# Patient Record
Sex: FEMALE | NOT HISPANIC OR LATINO | Employment: OTHER | URBAN - METROPOLITAN AREA
[De-identification: names, ages, dates, MRNs, and addresses within clinical notes are randomized per-mention and may not be internally consistent; named-entity substitution may affect disease eponyms.]

---

## 2017-01-04 ENCOUNTER — ROUTINE PRENATAL (OUTPATIENT)
Dept: OBSTETRICS AND GYNECOLOGY | Facility: CLINIC | Age: 32
End: 2017-01-04
Payer: MEDICAID

## 2017-01-04 ENCOUNTER — OFFICE VISIT (OUTPATIENT)
Dept: MATERNAL FETAL MEDICINE | Facility: CLINIC | Age: 32
End: 2017-01-04
Payer: MEDICAID

## 2017-01-04 VITALS — DIASTOLIC BLOOD PRESSURE: 62 MMHG | WEIGHT: 184 LBS | SYSTOLIC BLOOD PRESSURE: 110 MMHG | BODY MASS INDEX: 33.65 KG/M2

## 2017-01-04 DIAGNOSIS — Z34.93 PREGNANCY, OBSTETRICAL CARE, THIRD TRIMESTER: ICD-10-CM

## 2017-01-04 DIAGNOSIS — Z36.89 ENCOUNTER FOR ULTRASOUND TO CHECK FETAL GROWTH: ICD-10-CM

## 2017-01-04 DIAGNOSIS — Z91.89 AT RISK FOR INEFFECTIVE BREASTFEEDING: ICD-10-CM

## 2017-01-04 PROCEDURE — 99211 OFF/OP EST MAY X REQ PHY/QHP: CPT | Mod: PBBFAC | Performed by: ADVANCED PRACTICE MIDWIFE

## 2017-01-04 PROCEDURE — 99212 OFFICE O/P EST SF 10 MIN: CPT | Mod: TH,S$PBB,, | Performed by: ADVANCED PRACTICE MIDWIFE

## 2017-01-04 PROCEDURE — 99499 UNLISTED E&M SERVICE: CPT | Mod: S$PBB,,, | Performed by: OBSTETRICS & GYNECOLOGY

## 2017-01-04 PROCEDURE — 99999 PR PBB SHADOW E&M-EST. PATIENT-LVL I: CPT | Mod: PBBFAC,,, | Performed by: ADVANCED PRACTICE MIDWIFE

## 2017-01-04 PROCEDURE — 76816 OB US FOLLOW-UP PER FETUS: CPT | Mod: 26,S$PBB,, | Performed by: OBSTETRICS & GYNECOLOGY

## 2017-01-04 NOTE — PROGRESS NOTES
31 y.o. female  at 39w3d by LMP c/w 9wk US  Reports + FM, denies VB, LOF or regular CTX  Having irregular cramping  Has kasi Esquivel who will be coming with her to birth  Comes from US with MFM today  Reviewed US: vertex, mvp 7.0cm, efw 4032g at 84% (ac 89%)  -- Previous suspect macrosomia resolved  -- She communicates she is interested in attempting vaginal delivery does not want primary elective c/section  Hx SI and hospitalization in 2016  -- Without HI or SI today  -- Has spoken with Eleanor Madrigal NP, twice  -- Has decided not to start medication at this time but will continue to evaluate need for medication and will consider medication after delivery prn  -- Agrees to call immediately with development of HI/SI  TW lbs   Reviewed GBS neg   Reviewed repeat HIV/RPR neg/neg  Reviewed warning signs, normal FKCs, labor precautions and how/when to call.  RTC x 1 wks, call or present sooner prn.

## 2017-01-07 ENCOUNTER — ANESTHESIA (OUTPATIENT)
Dept: OBSTETRICS AND GYNECOLOGY | Facility: OTHER | Age: 32
End: 2017-01-07
Payer: MEDICAID

## 2017-01-07 ENCOUNTER — ANESTHESIA EVENT (OUTPATIENT)
Dept: OBSTETRICS AND GYNECOLOGY | Facility: OTHER | Age: 32
End: 2017-01-07
Payer: MEDICAID

## 2017-01-07 ENCOUNTER — HOSPITAL ENCOUNTER (INPATIENT)
Facility: OTHER | Age: 32
LOS: 4 days | Discharge: HOME | End: 2017-01-11
Attending: OBSTETRICS & GYNECOLOGY | Admitting: OBSTETRICS & GYNECOLOGY
Payer: MEDICAID

## 2017-01-07 DIAGNOSIS — Z98.891 S/P CESAREAN SECTION: Primary | ICD-10-CM

## 2017-01-07 DIAGNOSIS — Z34.93: ICD-10-CM

## 2017-01-07 DIAGNOSIS — Z37.9 NORMAL LABOR: ICD-10-CM

## 2017-01-07 PROCEDURE — 10907ZC DRAINAGE OF AMNIOTIC FLUID, THERAPEUTIC FROM PRODUCTS OF CONCEPTION, VIA NATURAL OR ARTIFICIAL OPENING: ICD-10-PCS | Performed by: OBSTETRICS & GYNECOLOGY

## 2017-01-07 PROCEDURE — 72100002 HC LABOR CARE, 1ST 8 HOURS

## 2017-01-07 PROCEDURE — 99283 EMERGENCY DEPT VISIT LOW MDM: CPT

## 2017-01-07 PROCEDURE — 11000001 HC ACUTE MED/SURG PRIVATE ROOM

## 2017-01-07 RX ORDER — OXYTOCIN/RINGER'S LACTATE 20/1000 ML
2 PLASTIC BAG, INJECTION (ML) INTRAVENOUS CONTINUOUS
Status: DISCONTINUED | OUTPATIENT
Start: 2017-01-07 | End: 2017-01-11 | Stop reason: HOSPADM

## 2017-01-07 RX ORDER — ONDANSETRON 8 MG/1
8 TABLET, ORALLY DISINTEGRATING ORAL EVERY 8 HOURS PRN
Status: DISCONTINUED | OUTPATIENT
Start: 2017-01-07 | End: 2017-01-08

## 2017-01-07 RX ORDER — BUPIVACAINE HYDROCHLORIDE 2.5 MG/ML
INJECTION, SOLUTION EPIDURAL; INFILTRATION; INTRACAUDAL
Status: DISPENSED
Start: 2017-01-07 | End: 2017-01-08

## 2017-01-07 RX ORDER — FENTANYL CITRATE 50 UG/ML
INJECTION, SOLUTION INTRAMUSCULAR; INTRAVENOUS
Status: DISPENSED
Start: 2017-01-07 | End: 2017-01-08

## 2017-01-07 RX ORDER — SODIUM CHLORIDE, SODIUM LACTATE, POTASSIUM CHLORIDE, CALCIUM CHLORIDE 600; 310; 30; 20 MG/100ML; MG/100ML; MG/100ML; MG/100ML
INJECTION, SOLUTION INTRAVENOUS CONTINUOUS
Status: DISCONTINUED | OUTPATIENT
Start: 2017-01-07 | End: 2017-01-08

## 2017-01-07 RX ORDER — FENTANYL/BUPIVACAINE/NS/PF 2MCG/ML-.1
PLASTIC BAG, INJECTION (ML) INJECTION
Status: DISPENSED
Start: 2017-01-07 | End: 2017-01-08

## 2017-01-07 RX ORDER — MISOPROSTOL 200 UG/1
600 TABLET ORAL
Status: DISCONTINUED | OUTPATIENT
Start: 2017-01-07 | End: 2017-01-08
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
## 2017-01-07 NOTE — PROGRESS NOTES
Doing well  Ambulating  VSS  's per doppler  CTX 4-6/50-60  VE 6/100/-2/bulging bag  Continue expectant management

## 2017-01-07 NOTE — Clinical Note
I certify that Inpatient services for greater than or equal to 2 midnights are medically necessary:: Yes   Transfer To (Destination): [facility], [place] LABOR AND DELIVERY [[id number]]   Diagnosis: Normal labor [515652]   Admitting Provider:: ALVARO CURRAN [[id number]]   Future Attending Provider: YRIS SALAS [[id number]]   Bed Type Preference: Standard [6]   Reason for IP Medical Treatment  (Clinical interventions that can only be accomplished in the IP setting? ) :: labor   Estimated Length of Stay:: 2 midnights   Plans for Post-Acute care--if anticipated (pick the single best option):: A. No post acute care anticipated at this time

## 2017-01-07 NOTE — PROVIDER PROGRESS NOTES - EMERGENCY DEPT.
Encounter Date: 1/7/2017    ED Physician Progress Notes        Physician Note:   Pt with PROM since 9:30 yesterday am with cont leakage throughout the day. Ctx began at 8 pm last night and have gotten stronger.   cx 3/90/-2 with palp buldging forbag   Will admit to ABC for delivery.

## 2017-01-07 NOTE — PROGRESS NOTES
In bed with partner and  at her side  C/O increased intensity with ctx.  VSS  's  CTX Q 3-5/50/mod  VE 4/90/-3 bulging bag  Continue expectant management  Reassess prn

## 2017-01-07 NOTE — PROGRESS NOTES
Ambulating in room    Breathing through ctx with   VSS  's  Ctx. Q 3-5/50-60  Will continue expectant management   Reassess prn

## 2017-01-07 NOTE — PROGRESS NOTES
Working with kasi  Asked to be checked  VSS  's  Ctx Q 4-5/ctx Q 50-60/mild to palp  VE 5.90/-3/cl fll  Continue expectant management  Reassess prn

## 2017-01-07 NOTE — IP AVS SNAPSHOT
[facility] Location ([place]) [address] [address]  Phone: [phone]           I have received a copy of my After Visit Summary and discharge instructions from [facility].    INSTRUCTIONS RECEIVED AND UNDERSTOOD BY:                     Patient/Patient Representative: ________________________________________________________________     Date/Time: ________________________________________________________________                     Instructions Given By: ________________________________________________________________     Date/Time: ________________________________________________________________

## 2017-01-08 ENCOUNTER — SURGERY (OUTPATIENT)
Age: 32
End: 2017-01-08

## 2017-01-08 LAB
ABO + RH BLD: NORMAL
ALBUMIN SERPL BCP-MCNC: 3 G/DL
ALLENS TEST: ABNORMAL
ALP SERPL-CCNC: 317 U/L
ALT SERPL W/O P-5'-P-CCNC: 13 U/L
ANION GAP SERPL CALC-SCNC: 20 MMOL/L
AST SERPL-CCNC: 40 U/L
BASOPHILS NFR BLD: 0 %
BILIRUB SERPL-MCNC: 0.6 MG/DL
BLD GP AB SCN CELLS X3 SERPL QL: NORMAL
BUN SERPL-MCNC: 11 MG/DL
CALCIUM SERPL-MCNC: 10.1 MG/DL
CHLORIDE SERPL-SCNC: 100 MMOL/L
CO2 SERPL-SCNC: 10 MMOL/L
CREAT SERPL-MCNC: 0.9 MG/DL
DIFFERENTIAL METHOD: ABNORMAL
EOSINOPHIL NFR BLD: 0 %
ERYTHROCYTE [DISTWIDTH] IN BLOOD BY AUTOMATED COUNT: 13.8 %
EST. GFR  (AFRICAN AMERICAN): >60 ML/MIN/1.73 M^2
EST. GFR  (NON AFRICAN AMERICAN): >60 ML/MIN/1.73 M^2
GLUCOSE SERPL-MCNC: 95 MG/DL
HCO3 UR-SCNC: 15.8 MMOL/L (ref 24–28)
HCT VFR BLD AUTO: 36.7 %
HGB BLD-MCNC: 12.8 G/DL
LDH SERPL L TO P-CCNC: 291 U/L
LYMPHOCYTES NFR BLD: 15 %
MCH RBC QN AUTO: 31.2 PG
MCHC RBC AUTO-ENTMCNC: 34.9 %
MCV RBC AUTO: 90 FL
MONOCYTES NFR BLD: 8 %
NEUTROPHILS NFR BLD: 74 %
NEUTS BAND NFR BLD MANUAL: 3 %
PCO2 BLDA: 53.8 MMHG (ref 35–45)
PH SMN: 7.08 [PH] (ref 7.35–7.45)
PLATELET # BLD AUTO: 337 K/UL
PLATELET BLD QL SMEAR: ABNORMAL
PMV BLD AUTO: 9.8 FL
PO2 BLDA: 13 MMHG (ref 80–100)
POC BE: -14 MMOL/L
POC SATURATED O2: 9 % (ref 95–100)
POTASSIUM SERPL-SCNC: 3.8 MMOL/L
PROT SERPL-MCNC: 7 G/DL
RBC # BLD AUTO: 4.1 M/UL
SAMPLE: ABNORMAL
SITE: ABNORMAL
SODIUM SERPL-SCNC: 130 MMOL/L
WBC # BLD AUTO: 27.13 K/UL

## 2017-01-08 PROCEDURE — 63600175 PHARM REV CODE 636 W HCPCS: Performed by: OBSTETRICS & GYNECOLOGY

## 2017-01-08 PROCEDURE — 25000003 PHARM REV CODE 250: Performed by: ANESTHESIOLOGY

## 2017-01-08 PROCEDURE — 59514 CESAREAN DELIVERY ONLY: CPT | Mod: GB,,, | Performed by: OBSTETRICS & GYNECOLOGY

## 2017-01-08 PROCEDURE — 76815 OB US LIMITED FETUS(S): CPT

## 2017-01-08 PROCEDURE — 11000001 HC ACUTE MED/SURG PRIVATE ROOM

## 2017-01-08 PROCEDURE — 36000685 HC CESAREAN SECTION LEVEL I

## 2017-01-08 PROCEDURE — 25000003 PHARM REV CODE 250: Performed by: STUDENT IN AN ORGANIZED HEALTH CARE EDUCATION/TRAINING PROGRAM

## 2017-01-08 PROCEDURE — 72100003 HC LABOR CARE, EA. ADDL. 8 HRS

## 2017-01-08 PROCEDURE — 99900035 HC TECH TIME PER 15 MIN (STAT)

## 2017-01-08 PROCEDURE — 83615 LACTATE (LD) (LDH) ENZYME: CPT

## 2017-01-08 PROCEDURE — 85027 COMPLETE CBC AUTOMATED: CPT

## 2017-01-08 PROCEDURE — 80053 COMPREHEN METABOLIC PANEL: CPT

## 2017-01-08 PROCEDURE — 63600175 PHARM REV CODE 636 W HCPCS: Performed by: ANESTHESIOLOGY

## 2017-01-08 PROCEDURE — 86900 BLOOD TYPING SEROLOGIC ABO: CPT

## 2017-01-08 PROCEDURE — 72100002 HC LABOR CARE, 1ST 8 HOURS

## 2017-01-08 PROCEDURE — 82803 BLOOD GASES ANY COMBINATION: CPT

## 2017-01-08 PROCEDURE — 86901 BLOOD TYPING SEROLOGIC RH(D): CPT

## 2017-01-08 PROCEDURE — 37000009 HC ANESTHESIA EA ADD 15 MINS: Performed by: OBSTETRICS & GYNECOLOGY

## 2017-01-08 PROCEDURE — 36415 COLL VENOUS BLD VENIPUNCTURE: CPT

## 2017-01-08 PROCEDURE — 27800517 HC TRAY,EPIDURAL-CONTINUOUS: Performed by: ANESTHESIOLOGY

## 2017-01-08 PROCEDURE — 27200710 HC EPIDURAL INFUSION PUMP SET: Performed by: ANESTHESIOLOGY

## 2017-01-08 PROCEDURE — 25000003 PHARM REV CODE 250: Performed by: ADVANCED PRACTICE MIDWIFE

## 2017-01-08 PROCEDURE — 37000008 HC ANESTHESIA 1ST 15 MINUTES: Performed by: OBSTETRICS & GYNECOLOGY

## 2017-01-08 PROCEDURE — 85007 BL SMEAR W/DIFF WBC COUNT: CPT

## 2017-01-08 PROCEDURE — 51702 INSERT TEMP BLADDER CATH: CPT

## 2017-01-08 PROCEDURE — 59514 CESAREAN DELIVERY ONLY: CPT | Mod: ,,, | Performed by: ANESTHESIOLOGY

## 2017-01-08 PROCEDURE — 62326 NJX INTERLAMINAR LMBR/SAC: CPT | Performed by: ANESTHESIOLOGY

## 2017-01-08 RX ORDER — SODIUM CHLORIDE, SODIUM LACTATE, POTASSIUM CHLORIDE, CALCIUM CHLORIDE 600; 310; 30; 20 MG/100ML; MG/100ML; MG/100ML; MG/100ML
INJECTION, SOLUTION INTRAVENOUS CONTINUOUS
Status: ACTIVE | OUTPATIENT
Start: 2017-01-08 | End: 2017-01-08

## 2017-01-08 RX ORDER — FAMOTIDINE 10 MG/ML
20 INJECTION INTRAVENOUS ONCE
Status: COMPLETED | OUTPATIENT
Start: 2017-01-08 | End: 2017-01-08

## 2017-01-08 RX ORDER — KETOROLAC TROMETHAMINE 30 MG/ML
30 INJECTION, SOLUTION INTRAMUSCULAR; INTRAVENOUS EVERY 6 HOURS
Status: COMPLETED | OUTPATIENT
Start: 2017-01-08 | End: 2017-01-08

## 2017-01-08 RX ORDER — MORPHINE SULFATE 0.5 MG/ML
INJECTION, SOLUTION EPIDURAL; INTRATHECAL; INTRAVENOUS
Status: DISCONTINUED | OUTPATIENT
Start: 2017-01-08 | End: 2017-01-08

## 2017-01-08 RX ORDER — HYDROCORTISONE 25 MG/G
CREAM TOPICAL 3 TIMES DAILY PRN
Status: DISCONTINUED | OUTPATIENT
Start: 2017-01-08 | End: 2017-01-11 | Stop reason: HOSPADM

## 2017-01-08 RX ORDER — DIPHENHYDRAMINE HCL 25 MG
25 CAPSULE ORAL EVERY 4 HOURS PRN
Status: DISCONTINUED | OUTPATIENT
Start: 2017-01-08 | End: 2017-01-11 | Stop reason: HOSPADM

## 2017-01-08 RX ORDER — ONDANSETRON 4 MG/1
8 TABLET, ORALLY DISINTEGRATING ORAL EVERY 8 HOURS PRN
Status: DISCONTINUED | OUTPATIENT
Start: 2017-01-08 | End: 2017-01-11 | Stop reason: HOSPADM

## 2017-01-08 RX ORDER — CHLOROPROCAINE HYDROCHLORIDE 20 MG/ML
INJECTION, SOLUTION EPIDURAL; INFILTRATION; INTRACAUDAL; PERINEURAL
Status: DISCONTINUED | OUTPATIENT
Start: 2017-01-08 | End: 2017-01-08

## 2017-01-08 RX ORDER — ADHESIVE BANDAGE
30 BANDAGE TOPICAL 2 TIMES DAILY PRN
Status: DISCONTINUED | OUTPATIENT
Start: 2017-01-09 | End: 2017-01-11 | Stop reason: HOSPADM

## 2017-01-08 RX ORDER — OXYCODONE AND ACETAMINOPHEN 10; 325 MG/1; MG/1
1 TABLET ORAL EVERY 4 HOURS PRN
Status: DISCONTINUED | OUTPATIENT
Start: 2017-01-09 | End: 2017-01-11 | Stop reason: HOSPADM

## 2017-01-08 RX ORDER — IBUPROFEN 600 MG/1
600 TABLET ORAL EVERY 6 HOURS
Status: DISCONTINUED | OUTPATIENT
Start: 2017-01-09 | End: 2017-01-11 | Stop reason: HOSPADM

## 2017-01-08 RX ORDER — OXYTOCIN/RINGER'S LACTATE 20/1000 ML
41.65 PLASTIC BAG, INJECTION (ML) INTRAVENOUS CONTINUOUS
Status: ACTIVE | OUTPATIENT
Start: 2017-01-08 | End: 2017-01-08

## 2017-01-08 RX ORDER — ACETAMINOPHEN 325 MG/1
650 TABLET ORAL EVERY 6 HOURS
Status: COMPLETED | OUTPATIENT
Start: 2017-01-08 | End: 2017-01-09

## 2017-01-08 RX ORDER — ONDANSETRON 2 MG/ML
4 INJECTION INTRAMUSCULAR; INTRAVENOUS ONCE
Status: DISCONTINUED | OUTPATIENT
Start: 2017-01-08 | End: 2017-01-08

## 2017-01-08 RX ORDER — SODIUM CITRATE AND CITRIC ACID MONOHYDRATE 334; 500 MG/5ML; MG/5ML
30 SOLUTION ORAL ONCE
Status: COMPLETED | OUTPATIENT
Start: 2017-01-08 | End: 2017-01-08

## 2017-01-08 RX ORDER — OXYCODONE HYDROCHLORIDE 5 MG/1
5 TABLET ORAL EVERY 4 HOURS PRN
Status: DISPENSED | OUTPATIENT
Start: 2017-01-08 | End: 2017-01-09

## 2017-01-08 RX ORDER — FENTANYL/BUPIVACAINE/NS/PF 2MCG/ML-.1
PLASTIC BAG, INJECTION (ML) INJECTION CONTINUOUS PRN
Status: DISCONTINUED | OUTPATIENT
Start: 2017-01-08 | End: 2017-01-08

## 2017-01-08 RX ORDER — FENTANYL CITRATE 50 UG/ML
INJECTION, SOLUTION INTRAMUSCULAR; INTRAVENOUS
Status: DISCONTINUED | OUTPATIENT
Start: 2017-01-08 | End: 2017-01-08

## 2017-01-08 RX ORDER — OXYCODONE AND ACETAMINOPHEN 5; 325 MG/1; MG/1
1 TABLET ORAL EVERY 4 HOURS PRN
Status: DISCONTINUED | OUTPATIENT
Start: 2017-01-09 | End: 2017-01-11 | Stop reason: HOSPADM

## 2017-01-08 RX ORDER — ONDANSETRON 2 MG/ML
4 INJECTION INTRAMUSCULAR; INTRAVENOUS EVERY 12 HOURS PRN
Status: DISCONTINUED | OUTPATIENT
Start: 2017-01-08 | End: 2017-01-11 | Stop reason: HOSPADM

## 2017-01-08 RX ORDER — DIPHENHYDRAMINE HYDROCHLORIDE 50 MG/ML
12.5 INJECTION INTRAMUSCULAR; INTRAVENOUS EVERY 4 HOURS PRN
Status: DISCONTINUED | OUTPATIENT
Start: 2017-01-08 | End: 2017-01-08

## 2017-01-08 RX ORDER — FENTANYL/BUPIVACAINE/NS/PF 2MCG/ML-.1
PLASTIC BAG, INJECTION (ML) INJECTION CONTINUOUS
Status: DISCONTINUED | OUTPATIENT
Start: 2017-01-08 | End: 2017-01-08

## 2017-01-08 RX ORDER — DIPHENHYDRAMINE HYDROCHLORIDE 50 MG/ML
25 INJECTION INTRAMUSCULAR; INTRAVENOUS EVERY 4 HOURS PRN
Status: DISCONTINUED | OUTPATIENT
Start: 2017-01-08 | End: 2017-01-11 | Stop reason: HOSPADM

## 2017-01-08 RX ORDER — FAMOTIDINE 10 MG/ML
20 INJECTION INTRAVENOUS ONCE
Status: DISCONTINUED | OUTPATIENT
Start: 2017-01-08 | End: 2017-01-08

## 2017-01-08 RX ORDER — CEFAZOLIN SODIUM 2 G/50ML
2 SOLUTION INTRAVENOUS ONCE
Status: COMPLETED | OUTPATIENT
Start: 2017-01-08 | End: 2017-01-08

## 2017-01-08 RX ORDER — LIDOCAINE HYDROCHLORIDE AND EPINEPHRINE 15; 5 MG/ML; UG/ML
INJECTION, SOLUTION EPIDURAL
Status: DISCONTINUED | OUTPATIENT
Start: 2017-01-08 | End: 2017-01-08

## 2017-01-08 RX ORDER — AMOXICILLIN 250 MG
1 CAPSULE ORAL NIGHTLY PRN
Status: DISCONTINUED | OUTPATIENT
Start: 2017-01-08 | End: 2017-01-11 | Stop reason: HOSPADM

## 2017-01-08 RX ORDER — METOCLOPRAMIDE HYDROCHLORIDE 5 MG/ML
10 INJECTION INTRAMUSCULAR; INTRAVENOUS ONCE
Status: COMPLETED | OUTPATIENT
Start: 2017-01-08 | End: 2017-01-08

## 2017-01-08 RX ORDER — DOCUSATE SODIUM 100 MG/1
200 CAPSULE, LIQUID FILLED ORAL 2 TIMES DAILY
Status: DISCONTINUED | OUTPATIENT
Start: 2017-01-08 | End: 2017-01-11 | Stop reason: HOSPADM

## 2017-01-08 RX ORDER — OXYCODONE HYDROCHLORIDE 5 MG/1
10 TABLET ORAL EVERY 4 HOURS PRN
Status: DISPENSED | OUTPATIENT
Start: 2017-01-08 | End: 2017-01-09

## 2017-01-08 RX ORDER — SODIUM CITRATE AND CITRIC ACID MONOHYDRATE 334; 500 MG/5ML; MG/5ML
30 SOLUTION ORAL ONCE
Status: DISCONTINUED | OUTPATIENT
Start: 2017-01-08 | End: 2017-01-08

## 2017-01-08 RX ORDER — PHENYLEPHRINE HYDROCHLORIDE 10 MG/ML
INJECTION INTRAVENOUS
Status: DISCONTINUED | OUTPATIENT
Start: 2017-01-08 | End: 2017-01-08

## 2017-01-08 RX ORDER — OXYTOCIN 10 [USP'U]/ML
INJECTION, SOLUTION INTRAMUSCULAR; INTRAVENOUS
Status: DISCONTINUED | OUTPATIENT
Start: 2017-01-08 | End: 2017-01-08

## 2017-01-08 RX ORDER — METOCLOPRAMIDE HYDROCHLORIDE 5 MG/ML
10 INJECTION INTRAMUSCULAR; INTRAVENOUS ONCE
Status: DISCONTINUED | OUTPATIENT
Start: 2017-01-08 | End: 2017-01-08

## 2017-01-08 RX ORDER — BISACODYL 10 MG
10 SUPPOSITORY, RECTAL RECTAL ONCE AS NEEDED
Status: ACTIVE | OUTPATIENT
Start: 2017-01-08 | End: 2017-01-08

## 2017-01-08 RX ORDER — SIMETHICONE 80 MG
1 TABLET,CHEWABLE ORAL EVERY 6 HOURS PRN
Status: DISCONTINUED | OUTPATIENT
Start: 2017-01-08 | End: 2017-01-11 | Stop reason: HOSPADM

## 2017-01-08 RX ADMIN — PHENYLEPHRINE HYDROCHLORIDE 200 MCG: 10 INJECTION INTRAVENOUS at 03:01

## 2017-01-08 RX ADMIN — DOCUSATE SODIUM 200 MG: 100 CAPSULE, LIQUID FILLED ORAL at 09:01

## 2017-01-08 RX ADMIN — Medication 10 ML: at 01:01

## 2017-01-08 RX ADMIN — SODIUM CHLORIDE, SODIUM LACTATE, POTASSIUM CHLORIDE, AND CALCIUM CHLORIDE 1000 ML: .6; .31; .03; .02 INJECTION, SOLUTION INTRAVENOUS at 01:01

## 2017-01-08 RX ADMIN — PHENYLEPHRINE HYDROCHLORIDE 100 MCG: 10 INJECTION INTRAVENOUS at 03:01

## 2017-01-08 RX ADMIN — SODIUM CHLORIDE, SODIUM LACTATE, POTASSIUM CHLORIDE, AND CALCIUM CHLORIDE: 600; 310; 30; 20 INJECTION, SOLUTION INTRAVENOUS at 03:01

## 2017-01-08 RX ADMIN — KETOROLAC TROMETHAMINE 30 MG: 30 INJECTION, SOLUTION INTRAMUSCULAR at 03:01

## 2017-01-08 RX ADMIN — FENTANYL CITRATE 100 MCG: 50 INJECTION, SOLUTION INTRAMUSCULAR; INTRAVENOUS at 01:01

## 2017-01-08 RX ADMIN — ACETAMINOPHEN 650 MG: 325 TABLET ORAL at 03:01

## 2017-01-08 RX ADMIN — ACETAMINOPHEN 650 MG: 325 TABLET ORAL at 08:01

## 2017-01-08 RX ADMIN — OXYTOCIN 3 UNITS: 10 INJECTION, SOLUTION INTRAMUSCULAR; INTRAVENOUS at 03:01

## 2017-01-08 RX ADMIN — OXYCODONE HYDROCHLORIDE 5 MG: 5 TABLET ORAL at 01:01

## 2017-01-08 RX ADMIN — KETOROLAC TROMETHAMINE 30 MG: 30 INJECTION, SOLUTION INTRAMUSCULAR at 09:01

## 2017-01-08 RX ADMIN — OXYCODONE HYDROCHLORIDE 10 MG: 5 TABLET ORAL at 04:01

## 2017-01-08 RX ADMIN — FENTANYL CITRATE 10 MCG: 50 INJECTION, SOLUTION INTRAMUSCULAR; INTRAVENOUS at 02:01

## 2017-01-08 RX ADMIN — METOCLOPRAMIDE 10 MG: 5 INJECTION, SOLUTION INTRAMUSCULAR; INTRAVENOUS at 01:01

## 2017-01-08 RX ADMIN — LIDOCAINE HYDROCHLORIDE AND EPINEPHRINE 3 ML: 15; 5 INJECTION, SOLUTION EPIDURAL at 01:01

## 2017-01-08 RX ADMIN — CHLOROPROCAINE HYDROCHLORIDE 5 ML: 20 INJECTION, SOLUTION EPIDURAL; INFILTRATION; INTRACAUDAL; PERINEURAL at 03:01

## 2017-01-08 RX ADMIN — OXYCODONE HYDROCHLORIDE 10 MG: 5 TABLET ORAL at 09:01

## 2017-01-08 RX ADMIN — FAMOTIDINE 20 MG: 10 INJECTION, SOLUTION INTRAVENOUS at 01:01

## 2017-01-08 RX ADMIN — CHLOROPROCAINE HYDROCHLORIDE 10 ML: 20 INJECTION, SOLUTION EPIDURAL; INFILTRATION; INTRACAUDAL; PERINEURAL at 02:01

## 2017-01-08 RX ADMIN — OXYCODONE HYDROCHLORIDE 10 MG: 5 TABLET ORAL at 07:01

## 2017-01-08 RX ADMIN — Medication 10 ML/HR: at 01:01

## 2017-01-08 RX ADMIN — CEFAZOLIN SODIUM 2 G: 2 SOLUTION INTRAVENOUS at 02:01

## 2017-01-08 RX ADMIN — ACETAMINOPHEN 650 MG: 325 TABLET ORAL at 09:01

## 2017-01-08 RX ADMIN — SODIUM CITRATE AND CITRIC ACID MONOHYDRATE 30 ML: 500; 334 SOLUTION ORAL at 01:01

## 2017-01-08 RX ADMIN — DOCUSATE SODIUM 200 MG: 100 CAPSULE, LIQUID FILLED ORAL at 08:01

## 2017-01-08 RX ADMIN — CHLOROPROCAINE HYDROCHLORIDE 5 ML: 20 INJECTION, SOLUTION EPIDURAL; INFILTRATION; INTRACAUDAL; PERINEURAL at 02:01

## 2017-01-08 RX ADMIN — SODIUM CHLORIDE, SODIUM LACTATE, POTASSIUM CHLORIDE, AND CALCIUM CHLORIDE: .6; .31; .03; .02 INJECTION, SOLUTION INTRAVENOUS at 01:01

## 2017-01-08 RX ADMIN — Medication 0.15 MG: at 02:01

## 2017-01-08 NOTE — ANESTHESIA PROCEDURE NOTES
Epidural    Patient location during procedure: OB   Reason for block: primary anesthetic   Diagnosis: IUP   Start time: 1/8/2017 1:06 AM  Timeout: 1/8/2017 1:05 AM  End time: 1/8/2017 1:15 AM  Surgery related to: Vaginal Delivery  Staffing  Anesthesiologist: EMMANUEL KRISHNAN  Resident/CRNA: LANI METZ  Performed by: resident/CRNA   Preanesthetic Checklist  Completed: patient identified, site marked, surgical consent, pre-op evaluation, timeout performed, IV checked, risks and benefits discussed, monitors and equipment checked, anesthesia consent given, hand hygiene performed and patient being monitored  Preparation  Patient position: sitting  Prep: ChloraPrep  Patient monitoring: Pulse Ox  Epidural  Skin Anesthetic: lidocaine 1%  Skin Wheal: 3 mL  Administration type: continuous  Approach: midline  Interspace: L3-4  Injection technique: XOCHILT saline  Needle and Epidural Catheter  Needle type: Tuohy   Needle gauge: 17  Needle length: 3.5 inches  Needle insertion depth: 5 cm  Catheter type: springwound  Catheter size: 19 G  Catheter at skin depth: 9 cm  Test dose: 3 mL of lidocaine 1.5% with Epi 1-to-200,000  Additional Documentation: incremental injection, negative aspiration for heme and CSF, no paresthesia on injection, no signs/symptoms of IV or SA injection, no significant pain on injection and no significant complaints from patient  Needle localization: anatomical landmarks  Medications:  Bolus administered: 10 mL of 0.125% bupivacaine  Opioid administered: 100 mcg of   fentanyl  Volume per aspiration: 5 mL  Time between aspirations: 5 minutes  Assessment   Dermatomal levels determined by ice  Ease of block: easy  Patient's tolerance of the procedure: comfortable throughout block and no complaints

## 2017-01-08 NOTE — PROGRESS NOTES
CNM Social Note  Very chatty  Doing well this am  States pain is well controlled with po rx  Baby in transferring from NICU to mother's room this pm

## 2017-01-08 NOTE — PROGRESS NOTES
In bed laboring with bar  C/O increased intensity with ctx.  Occas urge to push  Asked to be checked.   's  CTX Q 3-4/50-60  VE no change   AROM Cl Fl  Will continue expectant management   If no change in 2 hours will start pitocin per protocol

## 2017-01-08 NOTE — ANESTHESIA PROCEDURE NOTES
CSE    Patient location during procedure: OR  Start time: 1/8/2017 2:48 AM  Timeout: 1/8/2017 2:47 AM  End time: 1/8/2017 2:54 AM  Staffing  Anesthesiologist: EMMANUEL KRISHNAN  Resident/CRNA: LANI METZ  Performed by: anesthesiologist   Preanesthetic Checklist  Completed: patient identified, surgical consent, pre-op evaluation, timeout performed, IV checked, risks and benefits discussed and monitors and equipment checked  CSE  Patient position: sitting  Prep: ChloraPrep  Patient monitoring: heart rate, continuous pulse ox and frequent blood pressure checks  Approach: midline  Spinal Needle  Needle type: Shai   Needle gauge: 25 G  Needle length: 5 in  Epidural Needle  Injection technique: XOCHILT saline  Needle type: Tuohy   Needle gauge: 17 G  Needle length: 3.5 in  Needle insertion depth: 7 cm  Location: L3-4  Needle localization: anatomical landmarks  Catheter  Catheter type: DEMANDIT  Catheter size: 19 G  Catheter at skin depth: 11 cm  Test dose: lidocaine 1.5% with Epi 1-to-200,000  Additional Documentation: negative aspiration for CSF, negative aspiration for heme and negative test dose  Assessment  Sensory level: T4   Dermatomal levels determined by pinch or prick  Medications:  Bolus administered: 20 mL of 3.0 chloroprocaine  Intrathecal Medications:  administered: primary anesthetic mcg of  fentanyl and morphine (fentanyl 10 mcg, duramorph 150 mcg)  Additional Notes  Epidural test dosed, then dosed with 5+5+5+5 mL chloroprocaine with NaHCO3.

## 2017-01-08 NOTE — TRANSFER OF CARE
"Anesthesia Transfer of Care Note    Patient: Haylee Narvaez    Procedure(s) Performed: Procedure(s) (LRB):  DELIVERY- SECTION (N/A)    Patient location: PACU    Anesthesia Type: CSE    Transport from OR: Transported from OR on room air with adequate spontaneous ventilation    Post pain: adequate analgesia    Post assessment: no apparent anesthetic complications    Post vital signs: stable    Level of consciousness: awake, alert and oriented    Nausea/Vomiting: no nausea/vomiting    Complications: none          Last vitals:   Visit Vitals    BP (!) 161/72    Pulse (!) 121    Temp 36.4 °C (97.6 °F) (Temporal)    Resp (!) 30    Ht 5' 2" (1.575 m)    Wt 83.5 kg (184 lb)    LMP 2016    SpO2 99%    Breastfeeding No    BMI 33.65 kg/m2     "

## 2017-01-08 NOTE — PROGRESS NOTES
Very uncomfortble with ctx. Howling and very verbal  Says she has urge to push with each.  B/P167/92  's  CTX. Q 3-4   VE 5/ edematous cervix  Discussed possible C/S with Dr. Moore for failure to progress  Pt agrees with plan  Also agrees to epidural now.

## 2017-01-08 NOTE — ANESTHESIA PREPROCEDURE EVALUATION
2017  Haylee Narvaez is a 31 y.o., female  admitted for PROM. Pt has PMH asthma.    OB History    Para Term  AB SAB TAB Ectopic Multiple Living   2    1  1         # Outcome Date GA Lbr Kennedy/2nd Weight Sex Delivery Anes PTL Lv   2 Current            1 TAB 05 8w0d             Birth Comments: D&C            [facility] Anesthesia Evaluation    I have reviewed the Patient Summary Reports.    I have reviewed the Nursing Notes.   I have reviewed the Medications.     Review of Systems  Anesthesia Hx:  No problems with previous Anesthesia  History of prior surgery of interest to airway management or planning: Denies Family Hx of Anesthesia complications.   Denies Personal Hx of Anesthesia complications.   Hematology/Oncology:     Oncology Normal    -- Denies Anemia:   EENT/Dental:EENT/Dental Normal   Cardiovascular:  Cardiovascular Normal     Pulmonary:   Asthma    Hepatic/GI:  Hepatic/GI Normal    Neurological:  Neurology Normal    Endocrine:  Endocrine Normal    Psych:   depression          Physical Exam  General:  Well nourished    Airway/Jaw/Neck:  Airway Findings: Mouth Opening: Normal General Airway Assessment: Adult  Mallampati: II  Improves to II with phonation.  TM Distance: Normal, at least 6 cm         Dental:  Dental Findings: In tact   Chest/Lungs:  Chest/Lungs Clear    Heart/Vascular:  Heart Findings: Normal       Mental Status:  Mental Status Findings:  Cooperative, Alert and Oriented         Anesthesia Plan  Type of Anesthesia, risks & benefits discussed:  Anesthesia Type:  CSE, epidural, general, spinal  Patient's Preference:   Intra-op Monitoring Plan:   Intra-op Monitoring Plan Comments:   Post Op Pain Control Plan:   Post Op Pain Control Plan Comments:   Induction:   IV  Beta Blocker:         Informed Consent: Patient understands risks and agrees with Anesthesia plan.   Questions answered. Anesthesia consent signed with patient.  ASA Score: 2     Day of Surgery Review of History & Physical:            Ready For Surgery From Anesthesia Perspective.

## 2017-01-08 NOTE — PROGRESS NOTES
Mother encouraged to provide breastmilk for her NICU baby. Benefits of breastmilk discussed with mother. Mother declines pumping at this time. Mother encouraged to inform nurse if she changes her mind.

## 2017-01-09 PROCEDURE — 11000001 HC ACUTE MED/SURG PRIVATE ROOM

## 2017-01-09 PROCEDURE — 25000003 PHARM REV CODE 250: Performed by: STUDENT IN AN ORGANIZED HEALTH CARE EDUCATION/TRAINING PROGRAM

## 2017-01-09 PROCEDURE — 25000003 PHARM REV CODE 250: Performed by: ANESTHESIOLOGY

## 2017-01-09 RX ORDER — OXYCODONE HYDROCHLORIDE 5 MG/1
10 TABLET ORAL ONCE
Status: DISCONTINUED | OUTPATIENT
Start: 2017-01-09 | End: 2017-01-09

## 2017-01-09 RX ORDER — OXYCODONE HYDROCHLORIDE 5 MG/1
10 TABLET ORAL ONCE
Status: COMPLETED | OUTPATIENT
Start: 2017-01-09 | End: 2017-01-09

## 2017-01-09 RX ADMIN — IBUPROFEN 600 MG: 600 TABLET, FILM COATED ORAL at 01:01

## 2017-01-09 RX ADMIN — DOCUSATE SODIUM 200 MG: 100 CAPSULE, LIQUID FILLED ORAL at 09:01

## 2017-01-09 RX ADMIN — SIMETHICONE CHEW TAB 80 MG 80 MG: 80 TABLET ORAL at 01:01

## 2017-01-09 RX ADMIN — OXYCODONE HYDROCHLORIDE AND ACETAMINOPHEN 1 TABLET: 10; 325 TABLET ORAL at 04:01

## 2017-01-09 RX ADMIN — SIMETHICONE CHEW TAB 80 MG 80 MG: 80 TABLET ORAL at 07:01

## 2017-01-09 RX ADMIN — OXYCODONE HYDROCHLORIDE AND ACETAMINOPHEN 1 TABLET: 10; 325 TABLET ORAL at 09:01

## 2017-01-09 RX ADMIN — OXYCODONE HYDROCHLORIDE 10 MG: 5 TABLET ORAL at 12:01

## 2017-01-09 RX ADMIN — ACETAMINOPHEN 650 MG: 325 TABLET ORAL at 04:01

## 2017-01-09 RX ADMIN — IBUPROFEN 600 MG: 600 TABLET, FILM COATED ORAL at 08:01

## 2017-01-09 RX ADMIN — IBUPROFEN 600 MG: 600 TABLET, FILM COATED ORAL at 07:01

## 2017-01-09 RX ADMIN — OXYCODONE HYDROCHLORIDE 10 MG: 5 TABLET ORAL at 09:01

## 2017-01-09 RX ADMIN — OXYCODONE HYDROCHLORIDE AND ACETAMINOPHEN 1 TABLET: 10; 325 TABLET ORAL at 01:01

## 2017-01-09 RX ADMIN — OXYCODONE HYDROCHLORIDE AND ACETAMINOPHEN 1 TABLET: 10; 325 TABLET ORAL at 05:01

## 2017-01-09 RX ADMIN — DOCUSATE SODIUM 200 MG: 100 CAPSULE, LIQUID FILLED ORAL at 08:01

## 2017-01-09 NOTE — L&D DELIVERY NOTE
OPERATIVE NOTE      SECTION    DATE OF PROCEDURE: 2017    PREOPERATIVE DIAGNOSES:   1. Normal labor    2. Single pregnancy in third trimester    3.  delivery delivered        POSTOPERATIVE DIAGNOSES:   1. Normal labor    2. Single pregnancy in third trimester    3.  delivery delivered      SURGEON : ISRA Denny MD    ASSISTANT: Eleanor Matos MD, PhD    PROCEDURE: Primary low transverse  section via Pfannenstiel incision.     ANESTHESIA: Epidural anesthesia    ESTIMATED BLOOD LOSS: 324 cc    UOP: 75 cc    FLUIDS: 1000 cc    INDICATIONS: Ms. Haylee Narvaez is a 31 y.o.  female with IUP 40w0d weeks' gestational age who arrested at 4cm dilation    FINDINGS: male infant in cephalic presentation. Infant weighing 4205 g with Apgars of 6 and 8 at 1 and 5 minutes respectively. Normal appearing uterus, tubes and ovaries.     PROCEDURE IN DETAIL: The patient was taken back to the Operating Room where anesthesia was found to be adequate. The patient was prepped and draped in normal sterile fashion in dorsal supine position with leftward tilt.  Skin incision was made with scalpel and this incision was taken down to the underlying fascia with the knife. Incision was made in the midline of the fascia with inside knife; the incision was extended laterally using curved Bajwa scissors on both sides. Using Kocher clamps, the superior aspect of the fascia was grasped and tented up and the underlying rectus muscle was  off the fascia bluntly with the assistance of curved Bajwa scissors. In a similar fashion, the inferior aspect of the fascia was grasped with Kocher's, tented up and the underlying rectus muscle was  off bluntly with the assistance of curved Bajwa scissors. The muscle was then incised in midline and  bluntly. Peritoneum was identified, tented up with hemostats and entered sharply with Metzenbaum scissors and then extended superiorly and inferiorly  bluntly. Bladder blade was inserted and the lower uterine segment identified. A low transverse incision was made on the uterus with a scalpel. The amniotic sac was entered sharply. The infant was found to be in a cephalic presentation. The head was then delivered atraumatically and the infant shortly followed. The cord was clamped and cut and the infant was suctioned with bulb and handed off to the awaiting nurses. Cord gas and blood were collected and sent.   The placenta was then delivered with gentle cord traction and fundal massage. The uterus was then exteriorized and cleared of all debris and clots. The hysterotomy was then closed using #1 chromic in a running locked fashion. Good hemostasis was noted at both the angles and at the repaired hysterotomy incision. The posterior aspect of the uterus was irrigated and cleared of clots. The uterus was then replaced into the abdomen and the gutters were irrigated and cleared of all clots. The peritoneum was then reapproximated with 3-0 vicryl. The muscle was then reapproximated using #1 chromic. The fascia was then reapproximated using #1 PDS in a running fashion and tied at the midline. Good hemostasis was noted throughout. The skin was then closed using 4-0 Monocryl in a running fashion.   Sponge, lap, and needle counts were good x 2. The patient tolerated the procedure well and was transferred over to the recovery area in stable condition.       Delivery Information for  Min Narvaez    Birth information:  YOB: 2017   Time of birth: 3:15 AM   Sex: male   Head Delivery Date/Time: 2017  3:15 AM   Delivery type: , Low Transverse   Gestational Age: 40w0d    Delivery Providers    Delivering clinician:  MODESTA POLK   Other personnel:   Provider Role   WON WILLIS Resident   SIOBHAN KRISHNAN Anesthesiologist   LANI MEZT Resident   RADHA FERNANDEZ Registered Nurse   WANDA VIRGEN Registered Nurse    FRANDY KRAMER Surgical Tech   BRIAN RODRIGUEZ Registered Nurse   YRIS SALAS Midwife                   Measurements    Weight:  4205 g            Assessment    Living status:  Yes   Apgars    1 Minute:   5 Minute:   10 Minute 15 Minute 20 Minute   Skin Color: 0  1       Heart Rate: 2  2       Reflex Irritability: 1  2       Muscle Tone: 1  1       Respiratory Effort: 2  2       Total: 6  8                  Apgars Assigned By:  NICU          Assisted Delivery Details:    Forceps attempted?:  No   Vacuum extractor attempted?:  Yes   Vacuum type:  Kiwi   Number of pop offs:  2   Failed vacuum delivery?:  No             Shoulder Dystocia    Shoulder dystocia present?:  No                                             Presentation and Position    Presentation: Vertex   Position:     Occiput    Anterior               Interventions/Resuscitation    Method:  NICU Attended        Cord    Vessels:  3 vessels   Complications:  None   Delayed Cord Clamping?:  No   Cord Blood Disposition:  Sent with Baby   Gases Sent?:  Yes   Stem Cell Collection (by MD):  No        Placenta    Date and time:  2017  3:16 AM   Removal:  Manual removal   Appearance:  Intact   Placenta disposition:  Discarded            Labor Events:       labor: No     Labor Onset Date/Time:         Dilation Complete Date/Time:         Start Pushing Date/Time:       Rupture Date/Time:              Rupture type:           Fluid Amount:        Fluid Color:        Fluid Odor:        Membrane Status (PeriCalm): ARM (Artificial Rupture)      Rupture Date/Time (PeriCalm): 2017 21:25:00      Fluid Amount (PeriCalm): Moderate      Fluid Color (PeriCalm): Clear       steroids:       Antibiotics given for GBS:       Induction: none     Indications for induction:        Augmentation: amniotomy     Indications for augmentation:       Labor complications: Failure to Progress in First Stage     Additional  complications:          Cervical ripening:                     Delivery:      Episiotomy: None     Indication for Episiotomy:       Perineal Lacerations: None Repaired:      Periurethral Laceration: none Repaired:     Labial Laceration: none Repaired:     Sulcus Laceration: none Repaired:     Vaginal Laceration:   Repaired:     Cervical Laceration:   Repaired:     Repair suture:       Repair # of packets: 8     Blood loss (ml): 324     Vaginal Sweep Performed:       Surgicount Correct: Yes       Other providers:       Anesthesia    Method:  Epidural              Details (if applicable):  Trial of Labor Yes    Categorization: Primary    Priority: Routine   Indications for : Failure to Progress   Incision Type: Low Transverse     Additional  information:  Forceps:    Vacuum:    Breech:    Observed anomalies    Other (Comments): [facility] applied for delivery of infant, applied & popped off x 2, successful on 3rd application       Eleanor Matos MD, PhD  OBGYN, PGY-1

## 2017-01-09 NOTE — ANESTHESIA POSTPROCEDURE EVALUATION
"Anesthesia Post Evaluation    Patient: Haylee Narvaez    Procedure(s) Performed: Procedure(s) (LRB):  DELIVERY- SECTION (N/A)    Final Anesthesia Type: epidural  Patient location during evaluation: floor  Patient participation: Yes- Able to Participate  Level of consciousness: awake and alert  Post-procedure vital signs: reviewed and stable  Pain management: adequate  Airway patency: patent  PONV status at discharge: No PONV  Anesthetic complications: no      Cardiovascular status: blood pressure returned to baseline and hemodynamically stable  Respiratory status: unassisted, spontaneous ventilation and room air  Hydration status: euvolemic  Follow-up not needed.        Visit Vitals    /74    Pulse 89    Temp 37.1 °C (98.7 °F) (Oral)    Resp 18    Ht 5' 2" (1.575 m)    Wt 83.5 kg (184 lb)    LMP 2016    SpO2 100%    Breastfeeding Unknown    BMI 33.65 kg/m2       Pain/Billy Score: Pain Rating Prior to Med Admin: 5 (2017  1:31 PM)  Pain Rating Post Med Admin: 5 (2017  9:03 AM)      "

## 2017-01-09 NOTE — PROGRESS NOTES
POSTPARTUM PROGRESS NOTE     Haylee Narvaez is a 31 y.o. female, previously seen by the midwives, now POD #1 status post Primary  section at 40w0d in a pregnancy complicated by PROM with arrest of dilation at 6cm necessitating  delivery.     Patient is doing well this morning. She denies nausea, vomiting, fever or chills.  Patient reports mild abdominal pain that is adequately relieved by oral pain medications. Lochia is mild to moderate  and stable. Patient is voiding without difficulty and ambulating with no difficulty. She has passed flatus, and has not had BM.  Patient does plan to breast feed. Defer contraception to Midwives. Desires circumcision.    Objective:       Temp:  [98 °F (36.7 °C)-99.1 °F (37.3 °C)] 98 °F (36.7 °C)  Pulse:  [] 100  Resp:  [18] 18  BP: (103-137)/(54-74) 123/60    General:   alert, appears stated age and cooperative   Lungs:   clear to auscultation bilaterally   Heart:   regular rate and rhythm, S1, S2 normal, no murmur, click, rub or gallop   Abdomen:  soft, non-tender; bowel sounds normal; no masses,  no organomegaly   Uterus:  firm located at the umblicus.    Incision: Bandage in place, incision is clean, dry and intact   Extremities: peripheral pulses normal, no pedal edema, no clubbing or cyanosis     Lab Review  No results found for this or any previous visit (from the past 4 hour(s)).    I/O    Intake/Output Summary (Last 24 hours) at 17 0712  Last data filed at 17 0023   Gross per 24 hour   Intake                0 ml   Output             1300 ml   Net            -1300 ml        Assessment:     Patient Active Problem List   Diagnosis    Single pregnancy in third trimester    Depression    At risk for ineffective breastfeeding    Unplanned pregnancy    RESOLVED marginal placenta previa - per 32wk sono    History of suicidal ideation    Suspect macrosomia, resolved    Normal labor        Plan:   1. Postpartum care:  - Patient doing well.  Continue routine management and advances.  - Continue PO pain meds. Pain well controlled.  - Heme: H/h     Recent Labs  Lab 01/08/17  0032   WBC 27.13*   HGB 12.8   HCT 36.7*   MCV 90      - post H/H pending  - Encourage ambulation  - Circumcision desired, consents signed  - Contraception deferred to MW  - Lactation consult PRN    Dispo: As patient meets milestones, will plan to discharge POD #2-4.    Eleanor Matos MD, PhD  OBGYN, PGY-1

## 2017-01-10 PROBLEM — Z37.9 NORMAL LABOR: Status: RESOLVED | Noted: 2017-01-07 | Resolved: 2017-01-10

## 2017-01-10 PROBLEM — Z98.891 S/P CESAREAN SECTION: Status: ACTIVE | Noted: 2017-01-10

## 2017-01-10 LAB
ANISOCYTOSIS BLD QL SMEAR: SLIGHT
BASOPHILS # BLD AUTO: ABNORMAL K/UL
BASOPHILS NFR BLD: 0 %
DIFFERENTIAL METHOD: ABNORMAL
EOSINOPHIL # BLD AUTO: ABNORMAL K/UL
EOSINOPHIL NFR BLD: 2 %
ERYTHROCYTE [DISTWIDTH] IN BLOOD BY AUTOMATED COUNT: 14.5 %
HCT VFR BLD AUTO: 27.2 %
HGB BLD-MCNC: 9.3 G/DL
LYMPHOCYTES # BLD AUTO: ABNORMAL K/UL
LYMPHOCYTES NFR BLD: 26 %
MCH RBC QN AUTO: 30.9 PG
MCHC RBC AUTO-ENTMCNC: 34.2 %
MCV RBC AUTO: 90 FL
MONOCYTES # BLD AUTO: ABNORMAL K/UL
MONOCYTES NFR BLD: 3 %
MYELOCYTES NFR BLD MANUAL: 1 %
NEUTROPHILS NFR BLD: 67 %
NEUTS BAND NFR BLD MANUAL: 1 %
PLATELET # BLD AUTO: 289 K/UL
PLATELET BLD QL SMEAR: ABNORMAL
PMV BLD AUTO: 8.8 FL
POLYCHROMASIA BLD QL SMEAR: ABNORMAL
RBC # BLD AUTO: 3.01 M/UL
WBC # BLD AUTO: 12.63 K/UL

## 2017-01-10 PROCEDURE — 25000003 PHARM REV CODE 250: Performed by: STUDENT IN AN ORGANIZED HEALTH CARE EDUCATION/TRAINING PROGRAM

## 2017-01-10 PROCEDURE — 11000001 HC ACUTE MED/SURG PRIVATE ROOM

## 2017-01-10 PROCEDURE — 36415 COLL VENOUS BLD VENIPUNCTURE: CPT

## 2017-01-10 PROCEDURE — 85027 COMPLETE CBC AUTOMATED: CPT

## 2017-01-10 PROCEDURE — 85007 BL SMEAR W/DIFF WBC COUNT: CPT

## 2017-01-10 RX ADMIN — OXYCODONE HYDROCHLORIDE AND ACETAMINOPHEN 1 TABLET: 10; 325 TABLET ORAL at 02:01

## 2017-01-10 RX ADMIN — IBUPROFEN 600 MG: 600 TABLET, FILM COATED ORAL at 01:01

## 2017-01-10 RX ADMIN — IBUPROFEN 600 MG: 600 TABLET, FILM COATED ORAL at 08:01

## 2017-01-10 RX ADMIN — OXYCODONE HYDROCHLORIDE AND ACETAMINOPHEN 1 TABLET: 10; 325 TABLET ORAL at 01:01

## 2017-01-10 RX ADMIN — SIMETHICONE CHEW TAB 80 MG 80 MG: 80 TABLET ORAL at 01:01

## 2017-01-10 RX ADMIN — DOCUSATE SODIUM 200 MG: 100 CAPSULE, LIQUID FILLED ORAL at 08:01

## 2017-01-10 RX ADMIN — OXYCODONE HYDROCHLORIDE AND ACETAMINOPHEN 1 TABLET: 10; 325 TABLET ORAL at 10:01

## 2017-01-10 RX ADMIN — OXYCODONE HYDROCHLORIDE AND ACETAMINOPHEN 1 TABLET: 10; 325 TABLET ORAL at 06:01

## 2017-01-10 RX ADMIN — IBUPROFEN 600 MG: 600 TABLET, FILM COATED ORAL at 02:01

## 2017-01-10 RX ADMIN — SIMETHICONE CHEW TAB 80 MG 80 MG: 80 TABLET ORAL at 09:01
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
## 2017-01-10 NOTE — PROGRESS NOTES
POSTPARTUM PROGRESS NOTE     Haylee Narvaez is a 31 y.o. female, previously seen by the midwives, now POD #2 status post Primary  section at 40w0d in a pregnancy complicated by PROM with arrest of dilation at 6cm necessitating  delivery.     Patient is doing well this morning. She denies nausea, vomiting, fever or chills.  Patient reports mild abdominal pain that is adequately relieved by oral pain medications. Lochia is mild to moderate  and stable. Patient is voiding without difficulty and ambulating with no difficulty. She has passed flatus, and has not had BM. She complains of numbness and tingling in her right leg, specifically on her big toe  Patient does plan to breast feed. Defer contraception to Midwives. Desires circumcision.    Objective:       Temp:  [98.1 °F (36.7 °C)-98.7 °F (37.1 °C)] 98.1 °F (36.7 °C)  Pulse:  [] 87  Resp:  [18] 18  BP: (121-140)/(73-82) 121/82    General:   alert, appears stated age and cooperative   Lungs:   clear to auscultation bilaterally   Heart:   regular rate and rhythm, S1, S2 normal, no murmur, click, rub or gallop   Abdomen:  soft, non-tender; bowel sounds normal; no masses,  no organomegaly   Uterus:  firm located at the umblicus.    Incision: incision is clean, dry and intact   Extremities: peripheral pulses normal, no pedal edema, no clubbing or cyanosis     Lab Review  No results found for this or any previous visit (from the past 4 hour(s)).    I/O    Intake/Output Summary (Last 24 hours) at 01/10/17 0890  Last data filed at 17 0900   Gross per 24 hour   Intake                0 ml   Output              500 ml   Net             -500 ml        Assessment:     Patient Active Problem List   Diagnosis    Single pregnancy in third trimester    Depression    At risk for ineffective breastfeeding    Unplanned pregnancy    History of suicidal ideation    Suspect macrosomia, resolved    S/P  section        Plan:   1. Postpartum care:  -  Patient doing well. Continue routine management and advances.  - Continue PO pain meds. Pain well controlled.  - Heme: H/h     Recent Labs  Lab 01/08/17  0032   WBC 27.13*   HGB 12.8   HCT 36.7*   MCV 90      - post H/H pending  - Encourage ambulation  - Circumcision completed  - Contraception deferred to MW  - Lactation consult PRN    Dispo: As patient meets milestones, will plan to discharge POD #3-4.      Kirill Brasher MD  PGY-1 OB/GYN  [phone]    Doing well, no problems, no questions  I have reviewed the resident's note, personally evaluated the patient and agree with the diagnosis and management plan.

## 2017-01-11 VITALS
SYSTOLIC BLOOD PRESSURE: 141 MMHG | RESPIRATION RATE: 18 BRPM | WEIGHT: 184 LBS | HEIGHT: 62 IN | HEART RATE: 83 BPM | TEMPERATURE: 98 F | OXYGEN SATURATION: 98 % | BODY MASS INDEX: 33.86 KG/M2 | DIASTOLIC BLOOD PRESSURE: 79 MMHG

## 2017-01-11 PROCEDURE — 25000003 PHARM REV CODE 250: Performed by: STUDENT IN AN ORGANIZED HEALTH CARE EDUCATION/TRAINING PROGRAM

## 2017-01-11 RX ORDER — OXYCODONE AND ACETAMINOPHEN 5; 325 MG/1; MG/1
1 TABLET ORAL EVERY 4 HOURS PRN
Qty: 20 TABLET | Refills: 0 | Status: SHIPPED | OUTPATIENT
Start: 2017-01-11 | End: 2017-02-22

## 2017-01-11 RX ORDER — IBUPROFEN 600 MG/1
600 TABLET ORAL EVERY 6 HOURS
Qty: 30 TABLET | Refills: 1 | Status: SHIPPED | OUTPATIENT
Start: 2017-01-11

## 2017-01-11 RX ADMIN — OXYCODONE HYDROCHLORIDE AND ACETAMINOPHEN 1 TABLET: 10; 325 TABLET ORAL at 02:01

## 2017-01-11 RX ADMIN — DOCUSATE SODIUM 200 MG: 100 CAPSULE, LIQUID FILLED ORAL at 08:01

## 2017-01-11 RX ADMIN — HYDROCORTISONE 2.5%: 25 CREAM TOPICAL at 10:01

## 2017-01-11 RX ADMIN — IBUPROFEN 600 MG: 600 TABLET, FILM COATED ORAL at 08:01

## 2017-01-11 RX ADMIN — OXYCODONE HYDROCHLORIDE AND ACETAMINOPHEN 1 TABLET: 10; 325 TABLET ORAL at 12:01

## 2017-01-11 RX ADMIN — OXYCODONE HYDROCHLORIDE AND ACETAMINOPHEN 1 TABLET: 10; 325 TABLET ORAL at 08:01

## 2017-01-11 RX ADMIN — IBUPROFEN 600 MG: 600 TABLET, FILM COATED ORAL at 02:01

## 2017-01-11 RX ADMIN — SIMETHICONE CHEW TAB 80 MG 80 MG: 80 TABLET ORAL at 08:01

## 2017-01-11 NOTE — PROGRESS NOTES
"Subjective: Haylee Narvaez is a 31 y.o. female  s/p C/S for arrest of labor.  Eating, ambulating and urinating - WNL.   Reports very edematous legs and knees.  Reports minimal lochia, no concerns.  Infant Feeding - formula feeding exclusively.  Mood: no depression at this time. Discussed plan of care for counseling. She declines Zoloft at this time, but understands she can use Zoloft if necessary and has spoken with MACO Hernandez LCSW regarding plan of care.  [place]: Doing well, will F/U with pediatrician.  Contraception: "Abstinence" - Patient will discuss at follow-up appointment.    Objective:   Visit Vitals    /75    Pulse 98    Temp 98 °F (36.7 °C) (Oral)    Resp 18    Ht 5' 2" (1.575 m)    Wt 83.5 kg (184 lb)    LMP 2016    SpO2 98%    Breastfeeding Unknown    BMI 33.65 kg/m2     PE deferred to MD care.    Assessment:  1. Stable postpartum woman, S/P C/S for arrest of labor.  2. History of depression, declines medications at this time.    Plan:   1. Reviewed PP recommendations and precautions.  2. Discussed PPD prevention strategies.  3. Contraception: Will discuss with partner and follow up at postpartum visit.  4. RTO 2 weeks with MDs for incision check and 6 weeks PPV with CNMs.  5. Discharge pt home with baby, likely tomorrow am, per MD decision.   ~ ronny duarte cnm    "

## 2017-01-11 NOTE — PROGRESS NOTES
CNM social rounds  Jordana Vera CNM/NP  Certified Nurse Midwife/Nurse Practitioner  1/11/2017 9:46 AM

## 2017-01-11 NOTE — PROGRESS NOTES
POSTPARTUM PROGRESS NOTE     Haylee Narvaez is a 31 y.o. female, previously seen by the midwives, now POD #3 status post Primary  section at 40w0d in a pregnancy complicated by PROM with arrest of dilation at 6cm necessitating  delivery.     Patient is doing well this morning. She denies nausea, vomiting, fever or chills.  Patient reports mild abdominal pain that is adequately relieved by oral pain medications. Lochia is mild to moderate  and stable. Patient is voiding without difficulty and ambulating with no difficulty. She has passed flatus, and has not had BM. She complains of b/l LE swelling.  Patient does plan to breast feed. Defer contraception to Midwives.     Objective:       Temp:  [96.6 °F (35.9 °C)-98.1 °F (36.7 °C)] 96.6 °F (35.9 °C)  Pulse:  [78-98] 78  Resp:  [18] 18  BP: (120-132)/(67-82) 120/67    General:   alert, appears stated age and cooperative   Lungs:   clear to auscultation bilaterally   Heart:   regular rate and rhythm, S1, S2 normal, no murmur, click, rub or gallop   Abdomen:  soft, non-tender; bowel sounds normal; no masses,  no organomegaly   Uterus:  firm located at the umblicus.    Incision: incision is clean, dry and intact   Extremities: peripheral pulses normal,2= b/l edema, no clubbing or cyanosis     Lab Review  Lab Results   Component Value Date    WBC 12.63 01/10/2017    HGB 9.3 (L) 01/10/2017    HCT 27.2 (L) 01/10/2017    MCV 90 01/10/2017     01/10/2017       I/O    Intake/Output Summary (Last 24 hours) at 17 0641  Last data filed at 17 0300   Gross per 24 hour   Intake             1200 ml   Output              650 ml   Net              550 ml        Assessment:     Patient Active Problem List   Diagnosis    Single pregnancy in third trimester    Depression    At risk for ineffective breastfeeding    Unplanned pregnancy    History of suicidal ideation    Suspect macrosomia, resolved    S/P  section        Plan:   1. Postpartum  care:  - Patient doing well. Continue routine management and advances.  - Continue PO pain meds. Pain well controlled.  - Heme: H/h     Recent Labs  Lab 01/08/17  0032 01/10/17  1200   WBC 27.13* 12.63   HGB 12.8 9.3*   HCT 36.7* 27.2*   MCV 90 90    289     - Encourage ambulation  - Circumcision completed  - Contraception deferred to MW  - Lactation consult PRN    Dispo: Patient is stable and ready for discharge. Plan to discharge today after staff rounds.       Yuli Tirado M.D.  PGY-3 OB/GYN  [phone]

## 2017-01-11 NOTE — PLAN OF CARE
Problem: Patient Care Overview  Goal: Plan of Care Review  Outcome: Ongoing (interventions implemented as appropriate)  Fundus firm midline and 1 below. Bleeding is light. Incision is CDI. Adequate intake and output. Pt has moderate to severe swelling from waist down including perineum below the incision. Compression stockings applied, pt wearing SCD/s when in bed and elevating legs whenever possible. Trace swelling in hands. Pain controlled with IBU and Perc 10. Taking Perc 10 every 4 hours. No distress.

## 2017-01-11 NOTE — PLAN OF CARE
Problem: Patient Care Overview  Goal: Plan of Care Review  Outcome: Outcome(s) achieved Date Met:  01/11/17  VSS. Fundus firm. Light bleeding. Patient ambulating and voiding without difficulty. Pain well controlled with oral medications.

## 2017-01-11 NOTE — PLAN OF CARE
Met with pt prior to d/c to provide her with resources for counseling in the area.    [phone] (pt has already reached out to this agency for counseling in the past but never pursued it. Pt is interested in working with this organization.)    [facility]-[phone]    Pt plans to f/u with one of the options provided.    No further sw needs.        Apryl Hernandez LCSW    [facility]  [email or web address]    (phone) [phone] or  Ext. [phone]  (fax) [phone]

## 2017-01-11 NOTE — DISCHARGE SUMMARY
Delivery Discharge Summary  Obstetrics      Primary OB Clinician: [facility]    Admit Date: 2017  Discharge Date: 2017    Estimated Date of Delivery: 17     Conditions: PROM, hx depression    Active Hospital Problems    Diagnosis  POA    *S/P  section [Z98.891]  Not Applicable      Resolved Hospital Problems    Diagnosis Date Resolved POA    Normal labor [O80, Z37.9] 01/10/2017 Not Applicable       Procedures: Primary low transverse  delivery secondary to arrest of dilation    Hospital Course: Haylee Narvaez is a 31 y.o. now , POD #3 was admitted on 2017 for management of labor after presenting with PROM. On initial assessment, vital signs were stable and physical exam was consistent with ruptured membranes. Infant was in cephalic presentation. Patient was subsequently admitted to labor and delivery unit with signed consents. Labor course was managed expectantly, but dilation arrested at 4 cm for 8 hours with cervical edema. Patient was subsequently taken to the OR where she delivered a single viable  male via low transverse  section. Please see delivery and operative notes for further details. Pt was in stable condition post delivery and was transferred to the Mother-Baby Unit. Her postpartum course was uncomplicated. On discharge day, patient's pain is controlled with oral pain medications. Pt is tolerating ambulation without SOB or CP, and PO diet without N/V. Reports lochia is stable. Denies any HA, vision changes, F/C, LE swelling. Denies any breast pain/soreness.  Pt in stable condition and ready to go home. Discharge home to self care. Pt instructed to continue pain medications as needed and to follow up in the OB clinic in 6 weeks. Routine precautions given.    Studies:    Recent Labs  Lab 17  0032 01/10/17  1200   WBC 27.13* 12.63   HGB 12.8 9.3*   HCT 36.7* 27.2*   MCV 90 90    289        Delivery:    Episiotomy: None    Lacerations: None   Repair suture:     Repair # of packets: 8   Blood loss (ml): 0     Birth information:  YOB: 2017   Time of birth: 3:15 AM   Sex: male   Delivery type: , Low Transverse   Gestational Age: 40w0d    Delivery Clinician:      Other providers:       Additional  information:  Forceps:    Vacuum:    Breech:    Observed anomalies      Living?:           APGARS  One minute Five minutes Ten minutes   Skin color:         Heart rate:         Grimace:         Muscle tone:         Breathing:         Totals: 6  8        Placenta: Delivered:       appearance      Tubal Ligation: n/a  Feeding Method: bottle  Rh Immune Globulin Given(O POS): N/A    Discharge Date: 2017    Patient Instructions:   Current Discharge Medication List      CONTINUE these medications which have NOT CHANGED    Details   PNV NO.122/IRON/FOLIC ACID (PRENATAL #122-IRON-FOLIC ACID ORAL) Take by mouth.               Discharge Procedure Orders  Diet general     Activity as tolerated   Order Comments: Nothing in the vagina for 6 weeks. Gradual, progressive resumption of regular daily activity. Patient may drive in two weeks so long as she is not taking narcotics.     Call MD for:   Order Comments: fever >100.4 F, SOB/lightheadedness, persistent nausea and vomiting, uncontrolled pain or vaginal bleeding saturating a heavy pad in an hour for two consecutive hours         Follow-up Information     Follow up with Henry Roberts Jr, MD In 6 weeks.    Specialties:  Obstetrics, Obstetrics and Gynecology    Why:  Postoperative appointment    Contact information:    [address] [id number] [phone]              Olman Cantu Iii

## 2017-01-16 RX ORDER — OXYCODONE AND ACETAMINOPHEN 5; 325 MG/1; MG/1
2 TABLET ORAL EVERY 6 HOURS PRN
Qty: 10 TABLET | Refills: 0 | Status: SHIPPED | OUTPATIENT
Start: 2017-01-16 | End: 2017-02-22

## 2017-01-18 ENCOUNTER — TELEPHONE (OUTPATIENT)
Dept: OBSTETRICS AND GYNECOLOGY | Facility: CLINIC | Age: 32
End: 2017-01-18

## 2017-01-18 NOTE — TELEPHONE ENCOUNTER
----- Message from Debra Molina sent at 1/18/2017  3:52 PM CST -----  Contact: pt  _  1st Request  _  2nd Request  _  3rd Request      Who:pt    Why: pt would like  to schedule her incision check the week of Feb 20     What Number to Call Back: [phone]    When to Expect a call back: (Before the end of the day)   -- if call after 3:00 call back will be tomorrow.

## 2017-01-18 NOTE — TELEPHONE ENCOUNTER
Returned pt call regarding an incision check.  Pt stated that she sees the midwives, but she needs her incision checked by a physician.  Informed pt of appt date and time.  Pt verbalized understanding.  Letter sent out.

## 2017-01-24 ENCOUNTER — PATIENT MESSAGE (OUTPATIENT)
Dept: OBSTETRICS AND GYNECOLOGY | Facility: CLINIC | Age: 32
End: 2017-01-24

## 2017-01-25 ENCOUNTER — DOCUMENTATION ONLY (OUTPATIENT)
Dept: OBSTETRICS AND GYNECOLOGY | Facility: CLINIC | Age: 32
End: 2017-01-25

## 2017-01-25 NOTE — PROGRESS NOTES
Patient returned my phone call and reports the rash on her buttocks has started to heal. She reports no pain and itching. She declines a visit today, will follow up if it worsens.   ~ ronny duarte cnm

## 2017-01-25 NOTE — TELEPHONE ENCOUNTER
Left message with patient to call back. Will also forward this to the on-call midwife, Susan Orr CNM,  and front office today. We should be able to fit her in for an appointment today.   ~ ronny duarte cnm

## 2017-02-13 ENCOUNTER — PATIENT MESSAGE (OUTPATIENT)
Dept: OBSTETRICS AND GYNECOLOGY | Facility: CLINIC | Age: 32
End: 2017-02-13

## 2017-02-22 ENCOUNTER — POSTPARTUM VISIT (OUTPATIENT)
Dept: OBSTETRICS AND GYNECOLOGY | Facility: CLINIC | Age: 32
End: 2017-02-22
Attending: OBSTETRICS & GYNECOLOGY
Payer: MEDICAID

## 2017-02-22 VITALS
WEIGHT: 142 LBS | BODY MASS INDEX: 26.13 KG/M2 | HEIGHT: 62 IN | SYSTOLIC BLOOD PRESSURE: 130 MMHG | DIASTOLIC BLOOD PRESSURE: 86 MMHG

## 2017-02-22 DIAGNOSIS — Z51.89 VISIT FOR WOUND CHECK: Primary | ICD-10-CM

## 2017-02-22 DIAGNOSIS — Z98.891 S/P CESAREAN SECTION: ICD-10-CM

## 2017-02-22 PROCEDURE — 99999 PR PBB SHADOW E&M-EST. PATIENT-LVL III: CPT | Mod: PBBFAC,,, | Performed by: NURSE PRACTITIONER

## 2017-02-22 PROCEDURE — 99024 POSTOP FOLLOW-UP VISIT: CPT | Mod: ,,, | Performed by: NURSE PRACTITIONER

## 2017-02-22 PROCEDURE — 99213 OFFICE O/P EST LOW 20 MIN: CPT | Mod: PBBFAC | Performed by: NURSE PRACTITIONER

## 2017-02-22 NOTE — PROGRESS NOTES
Postpartum Visit  Haylee Narvaez is a 31 y.o. female  is here for a incision check. She is 6 weeks postpartum following a low cervical transverse  section, of a male infant weighin g, with Anesthesia: epidural. . The delivery was at 40w 0d. Dr. Gay performed the surgery d/t arrest of dilation. Pt sees the midwives and has her appt with them tomorrow. Denies pain. Reports right leg numbness since leaving the hospital-but has since resolved in the past week. Only taking prescribed motrin occasionally now. Formula feeding. Unsure of contraception plans-will discuss with Sepideh tomorrow. States LE swelling has improved.    Pregnancy was complicated by: depression, h/o suicidal ideation, unplanned pregnancy.      OB History    Para Term  AB SAB TAB Ectopic Multiple Living   2 1 1  1  1  0 1      # Outcome Date GA Lbr Kennedy/2nd Weight Sex Delivery Anes PTL Lv   2 Term 17 40w0d  4.205 kg (9 lb 4.3 oz) M CS-LTranv EPI N Y      Complications: Failure to Progress in First Stage   1 TAB 05 8w0d             Birth Comments: D&C          Postpartum course has been uncomplicated.  Bleeding no bleeding. Bowel/ bladder function is normal. Her last pap was 2016.  Patient is not sexually active. Postpartum depression screening: negative.    Baby's course has been uncomplicated. Baby is feeding by bottle -     ROS:  GENERAL: No fever, chills, fatigability.  VULVAR: No pain, no lesions and no itching.  VAGINAL: No relaxation, no itching, no discharge, no abnormal bleeding and no lesions.  ABDOMEN: No abdominal pain. Denies nausea. Denies vomiting. No diarrhea. No constipation  BREAST: Denies pain. No lumps. No discharge.  URINARY: No incontinence, no nocturia, no frequency and no dysuria.  CARDIOVASCULAR: No chest pain. No shortness of breath. No leg cramps.  NEUROLOGICAL: No headaches. No vision changes.      General appearance - alert, well appearing, and in no distress, oriented to  person, place, and time and normal appearing weight  Mental status - alert, oriented to person, place, and time, normal mood, behavior, speech, dress, motor activity, and thought processes  Skin - coloration normal for race, good turgor, warm to touch, no rashes  Abdomen - soft, nontender, nondistended, no masses or organomegaly  Pfannenstiel incision: Clean, dry, intact - healing well.  Pelvic - Deferred until visit tomorrow  Extremities - no edema, redness or tenderness in the calves or thighs      Haylee was seen today for postpartum follow-up and wound check.    Diagnoses and all orders for this visit:    Visit for wound check    S/P  section      Discussed contraception - will discuss more at aptt tomorrow with midwife  Counseling regarding resuming normal activities of exercise and work.  Postpartum precautions reviewed    RTC tomorrow for 6 week visit with midwives.

## 2017-02-22 NOTE — MR AVS SNAPSHOT
[facility] [address]  [address]  [address]  [facility] [address]  Phone: [phone]  Fax: [phone]                  Haylee Narvaez   2017 10:40 AM   Postpartum Visit    Description:  Female : 1985   Provider:  Magali Houston NP   Department:  [facility] [address]           Reason for Visit     Postpartum Follow-up     Wound Check                To Do List           Future Appointments        Provider Department Dept Phone    2017 10:30 AM Jordana Roberts CNM [facility] [phone]      Goals (5 Years of Data)     None      [facility]     [facility] -  Assistance  Registered nurses in the [facility] provide clinical advisement, health education, appointment booking, and other advisory services.  Call for this free service at [phone].             Medications           STOP taking these medications     oxycodone-acetaminophen (PERCOCET) 5-325 mg per tablet Take 1 tablet by mouth every 4 (four) hours as needed.    oxycodone-acetaminophen (ROXICET) 5-325 mg per tablet Take 2 tablets by mouth every 6 (six) hours as needed for Pain.           Verify that the below list of medications is an accurate representation of the medications you are currently taking.  If none reported, the list may be blank. If incorrect, please contact your healthcare provider. Carry this list with you in case of emergency.           Current Medications     ibuprofen (ADVIL,MOTRIN) 600 MG tablet Take 1 tablet (600 mg total) by mouth every 6 (six) hours.    PNV NO.122/IRON/FOLIC ACID (PRENATAL #122-IRON-FOLIC ACID ORAL) Take by mouth.           Clinical Reference Information           Prenatal Vitals     Enc. Date GA Prenatal Vitals Prenatal Pulse Pain Level Urine Albumin/Glucose Edema Presentation Dilation/Effacement/Station    17 40w0d 130/86 / 64.4 kg (141 lb 15.6 oz)           17 40w0d Admission Dept: [facility] L&D    17 39w6d  "Admission Dx: Normal labor Dept: [facility] MEME    17 39w3d 110/62 / 83.5 kg (184 lb) 41 cm / 145 / Present   Negative / Negative  Vertex 0 / 50 / -3    16 38w3d 122/84 / 82.6 kg (182 lb) 39.5 cm / 150 / Present   Trace / Trace  Vertex 0 / 50 / -3    16 37w4d 144/86 (A) / 83.5 kg (184 lb) 40 cm / 150 / Present   Negative / Negative  Vertex     16 36w3d 120/80 / 82.1 kg (181 lb) 37 cm / present / Present   Trace / Negative  Vertex 0 / 40 / -3    16 35w3d 126/84 / 78 kg (172 lb) 38 cm / 150 / Present   Trace / Trace  Vertex     16 32w5d 120/76 / 77.1 kg (170 lb) 35 cm / 140 / Present   Trace / Negative       10/17/16 28w1d 120/76 / 77.1 kg (170 lb) 28 cm / 160 / Present   Trace / Trace       16 24w1d 112/74 / 75.5 kg (166 lb 8 oz) 24 cm / 158 / Present   Negative / Negative       16 20w1d 114/70 / 74.4 kg (164 lb)  / present / Present   Negative / Negative       6/15/16 10w5d 110/70 / 69.4 kg (153 lb)   0 Negative / Negative None / None / None         TW.5 kg (32 lb)   Pregravid weight: 68.9 kg (152 lb)   Number of babies: 1   Height: 5' 2" (1.575 m)   BMI: 27.8       Your Vitals Were     BP Height Weight Last Period BMI    130/86 5' 2" (1.575 m) 64.4 kg (141 lb 15.6 oz) 2016 25.97 kg/m2      Allergies as of 2017     No Known Allergies      Immunizations Administered on Date of Encounter - 2017     None      Language Assistance Services     ATTENTION: Language assistance services are available, free of charge. Please call [phone].      ATENCIÓN: Si habla español, tiene a dempsey disposición servicios gratuitos de asistencia lingüística. Joseline malloy [phone].     KATHIE Ý: N?u b?n nói Ti?ng Vi?t, có các d?ch v? h? tr? ngôn ng? mi?n phí dành cho b?n. G?i s? [phone].         [facility] [address] complies with applicable Federal civil rights laws and does not discriminate on the basis of race, color, national origin, age, disability, or sex.      "

## 2017-02-23 ENCOUNTER — POSTPARTUM VISIT (OUTPATIENT)
Dept: OBSTETRICS AND GYNECOLOGY | Facility: CLINIC | Age: 32
End: 2017-02-23
Payer: MEDICAID

## 2017-02-23 PROCEDURE — 99211 OFF/OP EST MAY X REQ PHY/QHP: CPT | Mod: PBBFAC | Performed by: ADVANCED PRACTICE MIDWIFE

## 2017-02-23 PROCEDURE — 99999 PR PBB SHADOW E&M-EST. PATIENT-LVL I: CPT | Mod: PBBFAC,,, | Performed by: ADVANCED PRACTICE MIDWIFE

## 2017-02-23 NOTE — PROGRESS NOTES
"CC: Post-partum follow-up    Haylee Narvaez is a 31 y.o. female  who presents for post-partum visit.  She is 6 weeks S/P C/S. She and the baby are doing well.  Is bottlefeeding as planned AP. States baby was having problems with gas until they changed his formula. No pain.  No fever.   No bowel / bladder complaints. States she had a rough couple first weeks with edema of vulva and legs up to thighs. States her right leg has had some numbness but it is getting better. Now has tingling on and off throughout the day. Feels achy pressure in her right groin almost constantly. States she finds moving her right leg in certain positions is difficult but movement is improving. Reports a tingling in mons pubis from c/s scar to vulva. States bleeding decreased to yellowish discharge for several weeks. Bright red spotting for two days and now is brownish red lite flow.  Reports hemorrhoids that she noticed first postpartum.  Planning to go back to work part-time in a few weeks. Her niece is going to watch the baby while she works. FOB is minimally involved with the baby. States she has no expectations of help from him but that they "get along."  States she had little support from family postpartum. States she had the "baby blues bad" for several weeks but is feeling better. Denies thoughts of hurting herself. Would like to have counseling. Has reached out to [facility] but has not been able to get an appointment. Had talked to Eleanor Madrigal NP during pregnancy and she had referred her to a colleague but that colleague was not taking new patients. Has spoken to counselor at [facility]. States she does not want to consider taking anti-depressants. States she feels she is functioning well.  States she doesn't have a working car, which makes getting to a counseling appointment difficult.       Breast Feeding: NO  Depression:  EPDS Score of 10 with denial of suicidal thoughts.  Contraception: no sexual " partner at this time.    Visit Vitals    LMP 04/03/2016       ROS:  GENERAL: No fever, chills, fatigability.  VULVAR: No pain, no lesions and no itching.  VAGINAL: No relaxation, no itching, no discharge, no abnormal bleeding and no lesions.  ABDOMEN: No abdominal pain. Denies nausea. Denies vomiting. No diarrhea. No constipation  BREAST: Denies pain. No lumps. No discharge.  URINARY: No incontinence, no nocturia, no frequency and no dysuria.  CARDIOVASCULAR: No chest pain. No shortness of breath. No leg cramps.  NEUROLOGICAL: No headaches. No vision changes. See above   SD-SKELETAL: See above.    PHYSICAL EXAM:  ABDOMEN:  Soft, non-tender, non-distended, C/S scar well healed  VULVA:  Normal, no lesions  CERVIX:  Without lesions, polyps or tenderness.  UTERUS:  Normal size, shape, consistency, no mass or tenderness.  ADNEXA:  Normal in size without mass or tenderness  Extremities: Moved legs slowly when asked to raise them straight and with bent knees, but was able to demonstrate full range of motion.    IMP:  Normal postpartum involution  Instructions / precautions reviewed  Slow recovery of normal sensation of extremities and vulva but continuing to improve. Exercises recommended.  Contraceptive counseling      PLAN:  May resume normal activities  To call if neuro/sd/skeletal symptoms don't continue to improve. Would consider PT at that time.

## 2019-06-29 ENCOUNTER — HOSPITAL ENCOUNTER (EMERGENCY)
Facility: HOSPITAL | Age: 34
Discharge: HOME | End: 2019-06-30
Attending: EMERGENCY MEDICINE

## 2019-06-29 DIAGNOSIS — E86.9 VOLUME DEPLETION, GASTROINTESTINAL LOSS: ICD-10-CM

## 2019-06-29 DIAGNOSIS — R19.7 NAUSEA VOMITING AND DIARRHEA: ICD-10-CM

## 2019-06-29 DIAGNOSIS — R10.9 ABDOMINAL PAIN, UNSPECIFIED ABDOMINAL LOCATION: ICD-10-CM

## 2019-06-29 DIAGNOSIS — D72.829 LEUKOCYTOSIS, UNSPECIFIED TYPE: ICD-10-CM

## 2019-06-29 DIAGNOSIS — K52.9 ENTERITIS: Primary | ICD-10-CM

## 2019-06-29 DIAGNOSIS — R11.2 NAUSEA VOMITING AND DIARRHEA: ICD-10-CM

## 2019-06-29 LAB
ALBUMIN SERPL BCP-MCNC: 5.3 G/DL (ref 3.5–5.2)
ALP SERPL-CCNC: 111 U/L (ref 55–135)
ALT SERPL W/O P-5'-P-CCNC: 12 U/L (ref 10–44)
ANION GAP SERPL CALC-SCNC: 19 MMOL/L (ref 8–16)
AST SERPL-CCNC: 27 U/L (ref 10–40)
B-HCG UR QL: NEGATIVE
BASOPHILS # BLD AUTO: 0.06 K/UL (ref 0–0.2)
BASOPHILS NFR BLD: 0.3 % (ref 0–1.9)
BILIRUB SERPL-MCNC: 0.8 MG/DL (ref 0.1–1)
BUN SERPL-MCNC: 14 MG/DL (ref 6–20)
CALCIUM SERPL-MCNC: 11.7 MG/DL (ref 8.7–10.5)
CHLORIDE SERPL-SCNC: 99 MMOL/L (ref 95–110)
CO2 SERPL-SCNC: 18 MMOL/L (ref 23–29)
CREAT SERPL-MCNC: 1.4 MG/DL (ref 0.5–1.4)
CTP QC/QA: YES
DIFFERENTIAL METHOD: ABNORMAL
EOSINOPHIL # BLD AUTO: 0 K/UL (ref 0–0.5)
EOSINOPHIL NFR BLD: 0 % (ref 0–8)
ERYTHROCYTE [DISTWIDTH] IN BLOOD BY AUTOMATED COUNT: 13.6 % (ref 11.5–14.5)
EST. GFR  (AFRICAN AMERICAN): 56.9 ML/MIN/1.73 M^2
EST. GFR  (NON AFRICAN AMERICAN): 49.4 ML/MIN/1.73 M^2
GLUCOSE SERPL-MCNC: 116 MG/DL (ref 70–110)
HCT VFR BLD AUTO: 41.5 % (ref 37–48.5)
HGB BLD-MCNC: 13.6 G/DL (ref 12–16)
IMM GRANULOCYTES # BLD AUTO: 0.18 K/UL (ref 0–0.04)
IMM GRANULOCYTES NFR BLD AUTO: 0.9 % (ref 0–0.5)
LIPASE SERPL-CCNC: 22 U/L (ref 4–60)
LYMPHOCYTES # BLD AUTO: 0.8 K/UL (ref 1–4.8)
LYMPHOCYTES NFR BLD: 3.8 % (ref 18–48)
MCH RBC QN AUTO: 29.4 PG (ref 27–31)
MCHC RBC AUTO-ENTMCNC: 32.8 G/DL (ref 32–36)
MCV RBC AUTO: 90 FL (ref 82–98)
MONOCYTES # BLD AUTO: 0.7 K/UL (ref 0.3–1)
MONOCYTES NFR BLD: 3.6 % (ref 4–15)
NEUTROPHILS # BLD AUTO: 17.9 K/UL (ref 1.8–7.7)
NEUTROPHILS NFR BLD: 91.4 % (ref 38–73)
NRBC BLD-RTO: 0 /100 WBC
PLATELET # BLD AUTO: 351 K/UL (ref 150–350)
PMV BLD AUTO: 9.4 FL (ref 9.2–12.9)
POTASSIUM SERPL-SCNC: 4.3 MMOL/L (ref 3.5–5.1)
PROT SERPL-MCNC: 10.5 G/DL (ref 6–8.4)
RBC # BLD AUTO: 4.63 M/UL (ref 4–5.4)
SODIUM SERPL-SCNC: 136 MMOL/L (ref 136–145)
WBC # BLD AUTO: 19.54 K/UL (ref 3.9–12.7)

## 2019-06-29 PROCEDURE — 85025 COMPLETE CBC W/AUTO DIFF WBC: CPT

## 2019-06-29 PROCEDURE — 99284 EMERGENCY DEPT VISIT MOD MDM: CPT | Mod: ,,, | Performed by: PHYSICIAN ASSISTANT

## 2019-06-29 PROCEDURE — 81025 URINE PREGNANCY TEST: CPT | Performed by: PHYSICIAN ASSISTANT

## 2019-06-29 PROCEDURE — 25000003 PHARM REV CODE 250: Performed by: PHYSICIAN ASSISTANT

## 2019-06-29 PROCEDURE — 83690 ASSAY OF LIPASE: CPT

## 2019-06-29 PROCEDURE — 96361 HYDRATE IV INFUSION ADD-ON: CPT

## 2019-06-29 PROCEDURE — 99285 EMERGENCY DEPT VISIT HI MDM: CPT | Mod: 25

## 2019-06-29 PROCEDURE — 63600175 PHARM REV CODE 636 W HCPCS: Performed by: PHYSICIAN ASSISTANT

## 2019-06-29 PROCEDURE — 99284 PR EMERGENCY DEPT VISIT,LEVEL IV: ICD-10-PCS | Mod: ,,, | Performed by: PHYSICIAN ASSISTANT

## 2019-06-29 PROCEDURE — 96375 TX/PRO/DX INJ NEW DRUG ADDON: CPT

## 2019-06-29 PROCEDURE — 96374 THER/PROPH/DIAG INJ IV PUSH: CPT

## 2019-06-29 PROCEDURE — 80053 COMPREHEN METABOLIC PANEL: CPT

## 2019-06-29 PROCEDURE — S0028 INJECTION, FAMOTIDINE, 20 MG: HCPCS | Performed by: PHYSICIAN ASSISTANT

## 2019-06-29 RX ORDER — FAMOTIDINE 10 MG/ML
20 INJECTION INTRAVENOUS
Status: COMPLETED | OUTPATIENT
Start: 2019-06-29 | End: 2019-06-29

## 2019-06-29 RX ORDER — HYOSCYAMINE SULFATE 0.12 MG/1
0.12 TABLET SUBLINGUAL
Status: COMPLETED | OUTPATIENT
Start: 2019-06-29 | End: 2019-06-29

## 2019-06-29 RX ORDER — ONDANSETRON 2 MG/ML
4 INJECTION INTRAMUSCULAR; INTRAVENOUS
Status: COMPLETED | OUTPATIENT
Start: 2019-06-29 | End: 2019-06-29

## 2019-06-29 RX ADMIN — ONDANSETRON 4 MG: 2 INJECTION INTRAMUSCULAR; INTRAVENOUS at 08:06

## 2019-06-29 RX ADMIN — IOHEXOL 75 ML: 350 INJECTION, SOLUTION INTRAVENOUS at 11:06

## 2019-06-29 RX ADMIN — SODIUM CHLORIDE 1000 ML: 0.9 INJECTION, SOLUTION INTRAVENOUS at 08:06

## 2019-06-29 RX ADMIN — FAMOTIDINE 20 MG: 10 INJECTION, SOLUTION INTRAVENOUS at 08:06

## 2019-06-29 RX ADMIN — HYOSCYAMINE SULFATE 0.12 MG: 0.12 TABLET ORAL; SUBLINGUAL at 09:06

## 2019-06-29 NOTE — ED TRIAGE NOTES
"N/v/d since 6 am this morning. Patient states she "knows its food poisoning from chicken". She states she took two peptobismols and symptoms have not resolved.   "

## 2019-06-30 VITALS
DIASTOLIC BLOOD PRESSURE: 58 MMHG | BODY MASS INDEX: 29.44 KG/M2 | RESPIRATION RATE: 16 BRPM | TEMPERATURE: 99 F | WEIGHT: 160 LBS | HEIGHT: 62 IN | SYSTOLIC BLOOD PRESSURE: 103 MMHG | HEART RATE: 80 BPM | OXYGEN SATURATION: 99 %

## 2019-06-30 PROCEDURE — 25500020 PHARM REV CODE 255: Performed by: EMERGENCY MEDICINE

## 2019-06-30 PROCEDURE — 25000003 PHARM REV CODE 250: Performed by: PHYSICIAN ASSISTANT

## 2019-06-30 PROCEDURE — 96361 HYDRATE IV INFUSION ADD-ON: CPT

## 2019-06-30 RX ORDER — PROMETHAZINE HYDROCHLORIDE 25 MG/1
25 TABLET ORAL EVERY 6 HOURS PRN
Qty: 10 TABLET | Refills: 0 | Status: SHIPPED | OUTPATIENT
Start: 2019-06-30

## 2019-06-30 RX ADMIN — SODIUM CHLORIDE 1000 ML: 0.9 INJECTION, SOLUTION INTRAVENOUS at 12:06

## 2019-06-30 NOTE — ED NOTES
Pt unable to give urine sample at this time. Pt verbalize understanding of need for pregnancy test
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
## 2020-10-07 NOTE — PROGRESS NOTES
Repeat OB ultrasound (see full report under imaging tab in EPIC)  1. IUP - 39 and 5/7 weeks  2. EFW is in the upper normal range at the 84th%, and the interval growth pattern is stable  3. Normal AFV   Narendra

## 2021-04-15 ENCOUNTER — PATIENT MESSAGE (OUTPATIENT)
Dept: RESEARCH | Facility: HOSPITAL | Age: 36
End: 2021-04-15

## 2022-01-06 ENCOUNTER — LAB VISIT (OUTPATIENT)
Dept: PRIMARY CARE CLINIC | Facility: CLINIC | Age: 37
End: 2022-01-06
Payer: MEDICAID

## 2022-01-06 DIAGNOSIS — Z20.822 CONTACT WITH AND (SUSPECTED) EXPOSURE TO COVID-19: ICD-10-CM

## 2022-01-06 LAB
CTP QC/QA: YES
SARS-COV-2 AG RESP QL IA.RAPID: NEGATIVE

## 2022-01-06 PROCEDURE — 87811 SARS-COV-2 COVID19 W/OPTIC: CPT

## 2022-01-13 ENCOUNTER — LAB VISIT (OUTPATIENT)
Dept: PRIMARY CARE CLINIC | Facility: CLINIC | Age: 37
End: 2022-01-13
Payer: MEDICAID

## 2022-01-13 DIAGNOSIS — Z20.822 CONTACT WITH AND (SUSPECTED) EXPOSURE TO COVID-19: ICD-10-CM

## 2022-01-13 LAB
CTP QC/QA: YES
SARS-COV-2 AG RESP QL IA.RAPID: POSITIVE

## 2022-01-13 PROCEDURE — 87811 SARS-COV-2 COVID19 W/OPTIC: CPT

## 2022-05-12 ENCOUNTER — CLINICAL SUPPORT (OUTPATIENT)
Dept: REHABILITATION | Facility: HOSPITAL | Age: 37
End: 2022-05-12
Payer: MEDICAID

## 2022-05-12 DIAGNOSIS — R10.31 RIGHT LOWER QUADRANT ABDOMINAL PAIN: Primary | ICD-10-CM

## 2022-05-12 DIAGNOSIS — N39.3 SUI (STRESS URINARY INCONTINENCE, FEMALE): ICD-10-CM

## 2022-05-12 DIAGNOSIS — R10.2 PELVIC AND PERINEAL PAIN: ICD-10-CM

## 2022-05-12 PROBLEM — R10.30 LOWER ABDOMINAL PAIN: Status: ACTIVE | Noted: 2022-05-12

## 2022-05-12 PROCEDURE — 97530 THERAPEUTIC ACTIVITIES: CPT | Mod: PO

## 2022-05-12 PROCEDURE — 97112 NEUROMUSCULAR REEDUCATION: CPT | Mod: PO

## 2022-05-12 PROCEDURE — 97161 PT EVAL LOW COMPLEX 20 MIN: CPT | Mod: PO

## 2022-05-12 NOTE — PLAN OF CARE
"  [facility]   Pelvic Health Physical Therapy Initial Evaluation      Date: 2022   Name: Haylee Narvaez  Clinic Number: [id number]    Therapy Diagnosis: No diagnosis found.  Physician: Jose Luis Reyes MD    Physician Orders: PT Eval and Treat R10.32, R33.9, N81.89  Evaluation Date: 2022    Authorization Period Expiration: 2022  Plan of Care Expiration: 2022  Progress Note Due: 2022  Visit # / Visits authorized:    FOTO: Issued Visit #: -/-    Precautions: Standard    Time In: 1530  Time Out: 1620  Total Appointment Time (timed & untimed codes): 50 minutes    SUBJECTIVE     Date of onset: chronic, persistent, approx. 5 years or so "I didn't have these problems before I had a kid."    History of current condition - Fly reports: Reports sneezing (multiple times x3- able to brace for first, second sneeze in cycle will result in stress urinary incontinence), hard coughs (within the last year got worse), sit>stand with urge to pee.   - Also within last 90 days had abdominal and suprapubic pain, uti was ruled out, kidney stones possibly passed around the same time, and maybe scar tissue  - pain below scar at pelvic bone and more on left  with cough and is same pain of kidney stones and uti  - Was sent to urologist after pain at gyno and mentioning that still has blas  Fly goals: decrease pain; make it manageable; not feel limited 2/2 pain; cough without pain or peeing    Obstetric History:  ceserean birth x1-2017, 5 years, d&c x1-2005; had a large toddler (nearly 9#); "he got stuck and we had to cut him out, and so I still had the pushing and everything."    Bladder History: some hx of stress urinary incontinence; occasionally wake 1x/night at least 3x/week; when sit>standing, urine flow will trickle for a while; is sometimes able to empty if pushing    Bowel History: hx of constipation with rectal bleeding/hemorrhoids; type 2-3; was having some constipation " within the last 60 days and has been stopping sitting too long on the toilet; rectal urge and able to put off when needed; does not feel empty, no staining; wipes a lot - peanut butter     Support: does have some sensations of pressure at lower abdomen near scar. denies feeling/seeing bulge at vaginal opening/rectum. denies using a finger to help urine or stool out.      Menstruation/Sexual Health: reports deep dyspareunia (pressure and discomfort felt deeply, especially left, with min sexual activity within last 3 years); can manage with changing/modifying positions  - sporadic menstrual cycles within the last 6 months     Surgical History: Haylee Narvaez  has a past surgical history that includes   Dilation and curettage of uterus; breast reduction (2011); and [place] tooth extraction.  Abdominal Surgeries/Procedures: Yes, cesearean birth x1  Memorable Falls: Yes, but none that limited fnunction    Red Flags: Pt denies and new or previous changes to bowel/bladder, saddle anesthesia, unexplained weight loss, and unexplained changes to balance or gait.    Pain:  Location: lower abdomen  Current 6/10, Best 1/10; Worst 9/10,   Description: Aching, Dull, Throbbing, Grabbing, Tight, Deep, Shooting, and Superficial  Aggravated by: Vaginal exam/provocation, Inserting tampon , Bowel movement , Full bladder, Voiding, Prolonged standing, Prolonged sitting, Walking, General movement  and cough, sneeze; stress  Eased by: nothing    Prior Therapy: no  Social History:  lives with their son  Current exercise: none  Occupation: /legal civil  Prior Level of Function:   Self Care: Hygiene: Toileting [Regulating Urination, Defecation, Sexual/Reproductive Health]  Changing & Maintaining Body Position: Maintaining a Body Position: Remaining Seated   Current Level of Function:   Self Care: Hygiene: Toileting [Regulating Urination, Defecation, Sexual/Reproductive Health]   Changing & Maintaining Body Position: Maintaining a Body  Position: Remaining Seated  Medical History: Fly  has a past medical history of Anxiety, Asthma, H/O bilateral breast reduction surgery, Mental disorder, and Trauma.   Imaging: see chart   Medications: Haylee has a current medication list which includes the following prescription(s): ibuprofen, prenatal no122/iron/folic acid, and promethazine.    Allergies: Review of patient's allergies indicates:  No Known Allergies     OBJECTIVE   See EMR under MEDIA for written consent provided 5/12/2022  Chaperone: declined    FUNCTIONAL MOVEMENT  Bridges: cueing for breath and iap mgmt; mild reproduction of sx with hip lift  Thoracic ROM: limited flex and rotation bilaterally   Lumbar ROM:flex, rotation limited bilaterally   Hip ROM: ext, ir, and flex limited without pain  General Postural Observation: ribs flared in stnading and supine; improved post tx    OBSERVATION/PALPATION  Scarring: transverse scar- some restrictions and thickened tissue palpable decreased at end tx; decreased fascial mobility  improved with - umbilicis decreased at end of session with some pressure through bladder but less than usual and decressed pain with cough and no urinary urge upon stnading  bladder mob to decreased to left with intense referral of pain to left innter thigh, resovled with bladder mob  voiding diaryand colon massage nexxt time transvaginal or transrectal possibly decreased shelf post tx  colon massage   Rib cage/Breath: Decreased excursion bilaterally of lateral ribs , Decreased excursion of the sternum , Decreased excursion of posterior ribs and Excessive excursion of abdominal wall   Umbilicus: 12 with sharp pain to vagina; 3,9 with discomfort, but tolerable, 6 painless   Adductors: left with more discomfort compared to right with lasting pressure; right with some discomfort but limited   John/SI/Pelvis: ilial mobility grossly symmetrical, but c/o left feeling slightly limited; coccygeus release on left with more even feeling  between sides; lumbar spine parapsinals   Gluteals:  iliac crest on right with pain for nerve dist. vibration massage with some pain through scar    Total Treatment time (time-based codes) separate from Evaluation: 38 minutes    Pt verbally consents to today's tx. Signed consent on file.    2 units of TherAct: 23 minutes  improve pfm during functional mobility and adls, to improve body mechanics during toileting, and to simulate physical requirement of exercise, work, and parenting    [x]   periurethral and the bladder neck fascial release of bladder and uterus- bladder mobs to left    [x]   self-edu colon massage; voiding dairy assigned   [x] psoas release and round ligament release with decreased sharp pain through symph and vagina     1 units of Neuro Re-ed.: 15 minutes  sensory re-education to increase optimal response of tissues to sensory stimuli encountered in daily task completion, improve quality of muscle recruitment, improve motor control and sequencing, and increase coordination   [x]   Coordination, Control, Posture, and Proprioception    [x]   rib breath v/t cueing and facilitation at posterolateral ribs    [x]  postural/core awareness   []  pfm awareness - v/t cueing and facilitation for contract v relax, 100v50%; +breath cycle;  tension and compensation   [x]  fascial release to abdominal wall and suprapubic region and scar over mons pubis with genitofemoral and ilioinguinal nn    []      - units of TherEx: - minutes strength, endurance, and ROM    []     []   bridges    []        PATIENT EDUCATION AND HOME EXERCISES     Education provided:   general anatomy/physiology of urinary/ bowel  system, benefits of treatment, risks of treatment, and alternative methods of treatment were discussed with the patient. Additionally, anatomy/physiology of pelvic floor, posture/body mechanices, fluid intake/dietary modifications, and behavior modifications were reviewed.     Home Exercises provided: yes.  Exercises  "were reviewed and Fly was able to demonstrate them prior to the end of the session. Fly demonstrated good  understanding of the education provided. See EMR under Patient Instructions for exercises provided during therapy sessions.    ASSESSMENT     Haylee is a 36 y.o. female referred to outpatient Physical Therapy with a medical diagnosis of abdominal pain, incomplete emptying of bladder, and pelvic floor weakness, as well as complaints of difficulty with bowel movements and stress urinary incontinence. Pt presents with decreased strength and range of motion of bilateral lower extremities at hips; decreased range of motion of lumbar and thoracic spines, rib cage, and pelvis;  restricted abdominal and perineal fascial mobility; and decreased coordination of core and pfm during functional mobility. Positive response to today's tx with decreased distention of abdomen following tx and decreased  "shelf" appearance, with  resolution of urinary urgency upon standing.  Patient prognosis is Fair.   Patient will benefit from skilled outpatient Physical Therapy to address the deficits stated above and in the chart below, to optimize core, pelvic floor muscle, and bilateral lower extremity coordination to allow improved pelvic muscle proprioception, motor planning, muscle length, and improved safety, independence, and continence during parenting and self- adls, functional mobility.    Plan of care discussed with patient: Yes  Patient's spiritual, cultural and educational needs considered and patient is agreeable to the plan of care and goals as stated below:     Anticipated Barriers for therapy: none      Medical Necessity is demonstrated by the following:  History  Co-morbidities and personal factors that may impact the plan of care Co-morbidities   chronic constipation, prior abdominal surgery, prior pelvic surgery and young age    Personal Factors  no deficits     low   Examination  Body structures and functions, " "activity limitations and participation restrictions that may impact the plan of care Body Regions/Systems/Functions:  altered posture, pelvic asymmetry, poor knowledge of body mechanics and posture, poor coordination of pelvic floor muscles during ADL's leading to urinary or fecal leakage, incomplete urination, dysfunctional voiding, dysfunctional defecation and unable to co-contract or co-relax abdominal wall and pelvic floor muscles     Activity Limitations:  bearing down for BM, pain with BM , initiating a BM, full bladder emptying, intercourse/vaginal exam/tampon use without pain and incontinence with ADLs    Participation Restrictions:  ADLs affected by inability to fully empty bladder , social activities with friends/family, well woman's exam, relationship with spouse/partner, regularly having a comfortable BM, exercise restrictions due to incontinence  and difficulty starting urine stream or fully emptying bladder     Activity limitations:   Learning and applying knowledge  no deficits    General Tasks and Commands  no deficits    Communication  no deficits    Mobility  no deficits    Self care  no deficits    Domestic Life  no deficits    Interactions/Relationships  no deficits    Life Areas  no deficits    Community and Social Life  no deficits       low   Clinical Presentation stable and uncomplicated low   Decision Making/ Complexity Score: low     Goals:  Short Term Goals: 6 weeks     Pt will be able to sneeze/cough and stand without reproduction of sx.    Pt to perform "the knack" prior to coughing, laughing or sneezing to decrease risk of incontinence.    Pt to be able to delay the urge to urinate at least 5 minutes with a strong urge to urinate in order to make it to the bathroom without leaking.    Pt to report urinary frequency of once every 1.5 hours to improve ability to participate in social activities.    Pt to voice understanding of the role that diet plays on urinary urgency.      Pt to " demonstrate good body mechanics when performing ADLs such as lifting and bending to decrease strain to lumbopelvic structures and reduce risk of further injury.    Pt to report minimal pain with palpation of abdominal wall due to improvement in soft tissue mobility from moderate to minimal restrictions.    Pt to demonstrate proper positioning on commode with breathing techniques to decrease strain with BM to enable pt to feel empty after BM.     Pt to demonstrate independence with performing bowel massage to help with gut motility.     Pt to report being able to have a spontaneous bowel movement at least 4 a week without pain or straining at least 50% of the time to demonstrate improving gut motility and pelvic floor coordination.     Pt to be able to bulge pelvic floor without compensation which is needed for comfortable BM and complete evacuation.       Long Term Goals: 16 weeks    Pt to be discharged with home plan for carry over after discharge.      Pt to report an 85% reduction of urinary incontinence symptoms with ADL participation thereby demonstrating improved pelvic floor muscle control and strength.     Pt to be able to delay the urge to urinate at least 15 minutes with a strong urge to urinate in order to make it to the bathroom without leaking.    Pt to report urinary frequency of once every 3.5 hours without reproduction of sx to improve ability to participate in social activities.    Pt to report being able to have a spontaneous bowel movement at least 6 a week without pain or straining at least 90% of the time to demonstrate improving gut motility and pelvic floor coordination.       PLAN     Plan of care Certification: 5/12/2022 to 8/1/2022.    Outpatient Physical Therapy 1 time(s) every 2-4 week(s) for 16 weeks to include the following interventions: Gait Training, Manual Therapy, Neuromuscular Re-ed, Patient Education, Therapeutic Activities, Therapeutic Exercise, and dry needling.      Next Visit:    Melissa Harmon, PT      I CERTIFY THE NEED FOR THESE SERVICES FURNISHED UNDER THIS PLAN OF TREATMENT AND WHILE UNDER MY CARE   Physician's comments:     Physician's Signature: ___________________________________________________

## 2022-06-09 ENCOUNTER — CLINICAL SUPPORT (OUTPATIENT)
Dept: REHABILITATION | Facility: HOSPITAL | Age: 37
End: 2022-06-09
Payer: MEDICAID

## 2022-06-09 DIAGNOSIS — N39.3 SUI (STRESS URINARY INCONTINENCE, FEMALE): Primary | ICD-10-CM

## 2022-06-09 DIAGNOSIS — R10.31 RIGHT LOWER QUADRANT ABDOMINAL PAIN: ICD-10-CM

## 2022-06-09 DIAGNOSIS — R10.2 PELVIC AND PERINEAL PAIN: ICD-10-CM

## 2022-06-09 PROCEDURE — 97112 NEUROMUSCULAR REEDUCATION: CPT | Mod: PO

## 2022-06-09 PROCEDURE — 97530 THERAPEUTIC ACTIVITIES: CPT | Mod: PO

## 2022-06-09 NOTE — PROGRESS NOTES
Pelvic Health Physical Therapy   Progress Note     Name: Haylee Narvaez; [id number]  Visit Date: 6/9/2022  Visit 2 of 12 Auth. Exp.: 8/1/2022 POC Exp.: 8/1/2022   Eval Date: 5/12/2022 Next PN: 6/9/2022 - Cancels    FOTO: Issued Visit #: -/- Last PN: - - No Shows    Physician: Jose Luis Reyes MD  Physician Orders: PT Eval and Treat - R10.32, R33.9, N81.89  Therapy Diagnosis:   Encounter Diagnoses   Name Primary?    CIRA (stress urinary incontinence, female) Yes    Pelvic and perineal pain     Right lower quadrant abdominal pain      Precautions: Standard    Time In/Out: 1535/1635  Total Billable Time: 60 minutes    SUBJECTIVE     Goals:     Short Term Goals: 6 weeks     Pt will be able to sneeze/cough and stand without reproduction of sx.    Pt to be able to delay the urge to urinate at least 5 minutes with a strong urge to urinate in order to make it to the bathroom without leaking.    Pt to report urinary frequency of once every 1.5 hours to improve ability to participate in social activities.    Pt to voice understanding of the role that diet plays on urinary urgency.      Pt to demonstrate good body mechanics when performing ADLs such as lifting and bending to decrease strain to lumbopelvic structures and reduce risk of further injury.    Pt to demonstrate independence with performing bowel massage to help with gut motility.     Pt to report being able to have a spontaneous bowel movement at least 4 a week without pain or straining at least 50% of the time to demonstrate improving gut motility and pelvic floor coordination.      Long Term Goals: 16 weeks    Pt to be discharged with home plan for carry over after discharge.      Pt to report an 85% reduction of urinary incontinence symptoms with ADL participation thereby demonstrating improved pelvic floor muscle control and strength.     Pt to be able to delay the urge to urinate at least 15 minutes with a strong urge to urinate in order to make it  "to the bathroom without leaking.    Pt to report urinary frequency of once every 3.5 hours without reproduction of sx to improve ability to participate in social activities.    Pt to report being able to have a spontaneous bowel movement at least 6 a week without pain or straining at least 90% of the time to demonstrate improving gut motility and pelvic floor coordination.     Pt reports: Has been very busy.  - Did go to [facility] and noticed some stress urinary incontinence toward the middle of the visit there which had not happened before  - pain unchanged   - Poop - "pooping is good" has been without straining, has been going every other day and not feeling completely empty "I still don't feel like it's coming out all the way"; has been using a stool  - did feel a little more constipated before; has had more spinach and veggies  She was compliant with home exercise program, has increased veggie intake   Functional change/Response to previous treatment: improved veggies     TODAY'S TREATMENT: 55 minutes    Pt verbally consents to today's tx. Signed consent on file.   Informed of risks, benefits, and alternatives to transvaginal pelvic floor muscle assessment; offered verbal consent and signed consent on file.    2 units of TherAct: 25 minutes  improve pfm during functional mobility and adls, to improve body mechanics during toileting, and to simulate physical requirement of exercise, work, and parenting    [x]   periurethral and the bladder neck fascial release   [x]   iap mgmt +pelvic floor muscles function coordination   [x] voiding diary reassigned with demonstration and assist with first entry        2 units of Neuro Re-ed.: 35 minutes  sensory re-education to increase optimal response of tissues to sensory stimuli encountered in daily task completion, improve quality of muscle recruitment, improve motor control and sequencing, and increase coordination   [x]   Coordination, Control, Posture, and " Proprioception for pelvic floor muscles and iap    [x]   rib breath - with supination and add over counter with tension to back and ql area with discomfort, mild improvement with modification of positioning   [x]  postural/core awareness with relaxing abdomen with pelvic floor muscles    [x]  pfm awareness - v/t cueing and facilitation for contract v relax, 100v50%; +breath cycle;  tension and compensation; able to cough and relax belly with max effort and cueing  - adductor fascia with painful to palpation bilaterally but worse on left; stp and dtp worse on left;  left oi painful to palpation, pelvic floor with discomfort throughout but improved following bladder mob to midline on left; as well as improved force and relaxation of pelvic floor muscles with and after contraction  - sidelying pelvic floor muscles contraction with improved response and proprioception and able to hold 50% for 1 breath, given 10 reps/day   [x]  fascial release to pelvic floor muscles    []      Education provided:   - posture/body mechanices, diaphragmatic breathing, kegels, proper bearing down techniques, behavior modifications and Coordination of kegels with functional activities such as cough, laugh, sneeze, lift, etc.   Discussed progression of plan of care with patient; educated pt in activity modification; reviewed HEP with pt. Pt demonstrated and verbalized understanding of all instruction and was provided with a handout of HEP (see Patient Instructions).    Written Home Exercises Provided: yes.  Exercises were reviewed and Fly was able to demonstrate them prior to the end of the session.  Fly demonstrated good  understanding of the education provided.   See EMR under Patient Instructions for exercises provided 6/9/2022.    ASSESSMENT     Fly did well this visit with transvaginal pelvic floor muscles assessment and some disomcofrt and difficulty with coordinating pelvic floor muscles contraction with release to fascia and  muscles of hips nad pelvis. Pt also with good awareness of pelvic floor muscles with cough and with practice nad cueing able to coordinate with breath; with exception of push which was confused with pull at pelvic floor muscles level.   Fly Is progressing well towards her goals.   Pt prognosis is Fair.   Pt will continue to benefit from skilled outpatient physical therapy to address the deficits listed in the problem list box on initial evaluation, provide pt/family education and to maximize pt's level of independence in the home and community environment.   Pt's spiritual, cultural and educational needs considered and pt agreeable to plan of care and goals.     Anticipated barriers to physical therapy: none     PLAN   Next Visit: reassess pelvic floor mm awareness; review voiding diary     Plan of Care Re-Certification: 6/9/2022 to continue established POC.    Melissa Harmon, PT,DPT

## 2022-06-17 NOTE — PATIENT INSTRUCTIONS
"see hand out about pelvic floor muscles     Choose 3 days in a row to enter all food, drink, snacks, etc. you put in, and all pee and poo you put out. Time how long it takes you to finish urinating and use that for "How much?" use your rating scale to gauge your urgency.           "

## 2022-10-06 ENCOUNTER — PATIENT MESSAGE (OUTPATIENT)
Dept: RESEARCH | Facility: HOSPITAL | Age: 37
End: 2022-10-06
Payer: MEDICAID